# Patient Record
Sex: FEMALE | Race: WHITE | NOT HISPANIC OR LATINO | URBAN - METROPOLITAN AREA
[De-identification: names, ages, dates, MRNs, and addresses within clinical notes are randomized per-mention and may not be internally consistent; named-entity substitution may affect disease eponyms.]

---

## 2019-07-03 ENCOUNTER — HOSPITAL ENCOUNTER (OUTPATIENT)
Dept: RADIOLOGY | Facility: MEDICAL CENTER | Age: 49
End: 2019-07-03
Attending: INTERNAL MEDICINE
Payer: COMMERCIAL

## 2019-07-03 DIAGNOSIS — E27.9 ADRENAL HYPERTENSION (HCC): ICD-10-CM

## 2019-07-03 DIAGNOSIS — I15.2 ADRENAL HYPERTENSION (HCC): ICD-10-CM

## 2019-07-03 DIAGNOSIS — E83.59 TUMORAL CALCINOSIS: ICD-10-CM

## 2019-07-03 PROCEDURE — 4410556 CT-CARDIAC SCORING

## 2022-01-17 ENCOUNTER — OFFICE VISIT (OUTPATIENT)
Dept: MEDICAL GROUP | Facility: CLINIC | Age: 52
End: 2022-01-17
Payer: COMMERCIAL

## 2022-01-17 VITALS
OXYGEN SATURATION: 96 % | BODY MASS INDEX: 23.92 KG/M2 | HEIGHT: 62 IN | TEMPERATURE: 99.4 F | SYSTOLIC BLOOD PRESSURE: 110 MMHG | RESPIRATION RATE: 18 BRPM | HEART RATE: 105 BPM | DIASTOLIC BLOOD PRESSURE: 72 MMHG | WEIGHT: 130 LBS

## 2022-01-17 DIAGNOSIS — E27.40 ADRENAL INSUFFICIENCY (HCC): ICD-10-CM

## 2022-01-17 DIAGNOSIS — M25.50 ARTHRALGIA, UNSPECIFIED JOINT: ICD-10-CM

## 2022-01-17 DIAGNOSIS — E11.9 TYPE 2 DIABETES MELLITUS WITHOUT COMPLICATION, WITHOUT LONG-TERM CURRENT USE OF INSULIN (HCC): ICD-10-CM

## 2022-01-17 LAB
HBA1C MFR BLD: 7.2 % (ref 0–5.6)
INT CON NEG: ABNORMAL
INT CON POS: ABNORMAL

## 2022-01-17 PROCEDURE — 99214 OFFICE O/P EST MOD 30 MIN: CPT | Performed by: FAMILY MEDICINE

## 2022-01-17 PROCEDURE — 83036 HEMOGLOBIN GLYCOSYLATED A1C: CPT | Performed by: FAMILY MEDICINE

## 2022-01-17 RX ORDER — CALCITRIOL 0.25 UG/1
CAPSULE, LIQUID FILLED ORAL
COMMUNITY
Start: 2021-12-30 | End: 2023-02-21 | Stop reason: SDUPTHER

## 2022-01-17 RX ORDER — CHLORAL HYDRATE 500 MG
1 CAPSULE ORAL 2 TIMES DAILY
COMMUNITY

## 2022-01-17 RX ORDER — EMPAGLIFLOZIN 10 MG/1
1 TABLET, FILM COATED ORAL
COMMUNITY
Start: 2022-01-06 | End: 2022-03-28

## 2022-01-17 RX ORDER — SPIRONOLACTONE 25 MG/1
TABLET ORAL
COMMUNITY
Start: 2021-11-03 | End: 2022-03-28

## 2022-01-17 RX ORDER — LEVONORGESTREL AND ETHINYL ESTRADIOL 0.15-0.03
1 KIT ORAL DAILY
COMMUNITY
End: 2022-03-28

## 2022-01-17 NOTE — PROGRESS NOTES
Subjective:     CC: Diabetes and Oyung's disease follow-up    HPI:   Bee presents today with a need for A1c check.  She has not been checking her blood sugars at home.  However has been feeling fairly good except for her rheumatologic problems.    She was referred last visit to rheumatology.  They have done a little work-up including x-rays and blood work all of which is negative.  Her symptoms somewhat got better however in the last couple days have been getting worse where she has migratory joint pain.    Her [place]'s disease has been very stable.  She continues to take dexamethasone 0.75 mg daily.    She recently had labs through the rheumatologist.  1 of which included TSH.  That was normal    Problem   Arthralgia    Saw rheumatologist.  To have follow up tomorrow  W/u so far not consistent RA  XR negative  ESR negative  RF negative     Adrenal Insufficiency (Hcc)    Been stable.  On dexamethasone.  .75mg dexamethasone.  No changes.       Cataract of Both Eyes   Type 2 Diabetes Mellitus Without Complication (Hcc)    Time for A1c.  Not checked at home         Current Outpatient Medications Ordered in Epic   Medication Sig Dispense Refill   • Ascorbic Acid 500 MG Cap CR Take  by mouth.     • calcitRIOL (ROCALTROL) 0.25 MCG Cap TAKE 1 CAPSULE BY MOUTH EVERY OTHER DAY     • dexamethasone (DECADRON) 0.75 MG tablet Take 0.75 mg by mouth every day.     • JARDIANCE 10 MG Tab Take 1 Tablet by mouth at bedtime.     • spironolactone (ALDACTONE) 25 MG Tab      • Omega-3 Fatty Acids (FISH OIL) 1000 MG Cap capsule Take 1 Tablet by mouth 2 times a day.     • levonorgestrel-ethinyl estradiol (NORDETTE) 0.15-30 MG-MCG per tablet Take 1 Tablet by mouth every day.       No current [facility]-ordered facility-administered medications on file.       Health Maintenance:     ROS:  Gen: no fevers/chills, no changes in weight  Eyes: no changes in vision  ENT: no sore throat, no hearing loss, no bloody nose  Pulm: no sob, no  "cough  CV: no chest pain, no palpitations  GI: no nausea/vomiting, no diarrhea  : no dysuria  MSk: no myalgias  Skin: no rash  Neuro: no headaches, no numbness/tingling  Heme/Lymph: no easy bruising      Objective:     Exam:  /72 (BP Location: Left arm, Patient Position: Sitting, BP Cuff Size: Adult)   Pulse (!) 105   Temp 37.4 °C (99.4 °F) (Tympanic)   Resp 18   Ht 1.575 m (5' 2\")   Wt 59 kg (130 lb)   SpO2 96%   BMI 23.78 kg/m²  Body mass index is 23.78 kg/m².    Gen: Alert and oriented, No apparent distress.  Neck: Neck is supple without lymphadenopathy.  Lungs: Normal effort, CTA bilaterally, no wheezes, rhonchi, or rales  CV: Regular rate and rhythm. No murmurs, rubs, or gallops.  Ext: No clubbing, cyanosis, edema.      Labs: TSH normal.  RA labs normal.  Normal xrays    Assessment & Plan:     51 y.o. female with the following -     Problem List Items Addressed This Visit     Adrenal insufficiency (HCC)     Continue dexamethasone         Arthralgia     Follow  Up with rheumatologist  Using tylenol arthritis for pain         Type 2 diabetes mellitus without complication (HCC)     A1c here and was 7.2.  7.6 last time.    No medication changes at this time         Relevant Medications    JARDIANCE 10 MG Tab    Other Relevant Orders    POCT  A1C (Completed)              Return in about 3 months (around 4/17/2022) for diabetes.    Please note that this dictation was created using voice recognition software. I have made every reasonable attempt to correct obvious errors, but I expect that there are errors of grammar and possibly content that I did not discover before finalizing the note.      "

## 2022-03-18 ENCOUNTER — TELEPHONE (OUTPATIENT)
Dept: MEDICAL GROUP | Facility: CLINIC | Age: 52
End: 2022-03-18
Payer: COMMERCIAL

## 2022-03-18 NOTE — TELEPHONE ENCOUNTER
"Patient called with medical concerns below: Numbness and tingly in hands and feet- constant   Balance issues- using \"walking stick\"  She is wondering if she should be seen sooner than her appointment with you on April 19th or if you suggest a referral be placed at this time. Thank you  "

## 2022-03-21 NOTE — TELEPHONE ENCOUNTER
Phone Number Called: [phone]    Call outcome: Did not leave a detailed message. Requested patient to call back.

## 2022-03-23 NOTE — TELEPHONE ENCOUNTER
Phone Number Called: [phone]    Call outcome: Did not leave a detailed message. Requested patient to call back.

## 2022-03-28 ENCOUNTER — OFFICE VISIT (OUTPATIENT)
Dept: MEDICAL GROUP | Facility: CLINIC | Age: 52
End: 2022-03-28
Payer: COMMERCIAL

## 2022-03-28 VITALS
HEART RATE: 95 BPM | BODY MASS INDEX: 24.66 KG/M2 | SYSTOLIC BLOOD PRESSURE: 137 MMHG | WEIGHT: 134 LBS | HEIGHT: 62 IN | OXYGEN SATURATION: 94 % | DIASTOLIC BLOOD PRESSURE: 75 MMHG

## 2022-03-28 DIAGNOSIS — E27.40 ADRENAL INSUFFICIENCY (HCC): ICD-10-CM

## 2022-03-28 DIAGNOSIS — M25.59 PAIN IN OTHER JOINT: ICD-10-CM

## 2022-03-28 PROCEDURE — 99215 OFFICE O/P EST HI 40 MIN: CPT | Performed by: FAMILY MEDICINE

## 2022-03-28 RX ORDER — LEVONORGESTREL AND ETHINYL ESTRADIOL 0.15-0.03
KIT ORAL
COMMUNITY
Start: 2017-01-16

## 2022-03-28 RX ORDER — CALCITRIOL 0.25 UG/1
0.25 CAPSULE, LIQUID FILLED ORAL
COMMUNITY
Start: 2017-01-16 | End: 2022-03-28

## 2022-03-28 RX ORDER — MELOXICAM 15 MG/1
TABLET ORAL
COMMUNITY
Start: 2021-07-28 | End: 2022-03-28

## 2022-03-28 RX ORDER — SPIRONOLACTONE 25 MG/1
25 TABLET ORAL DAILY
COMMUNITY
Start: 2014-07-24 | End: 2023-02-21 | Stop reason: SDUPTHER

## 2022-03-28 RX ORDER — EMPAGLIFLOZIN 10 MG/1
TABLET, FILM COATED ORAL
COMMUNITY
Start: 2021-10-06 | End: 2022-08-16

## 2022-03-28 NOTE — PROGRESS NOTES
"Subjective:     CC: Arthralgia, myalgia. Hx Addisons,     HPI:     Problem   Arthralgia    All joints painful, swollen sensation. Episodic w exertion, sitting too long. Muscle weakness, fatigue  Intense ache in the hips. Some swelling  Saw rheumatologist.  Labs reviewed, Rheum note reviewed. No elevation in inflammatory markers W/u so far not consistent RA  XR negative  ESR negative  RF negative     Adrenal Insufficiency (Hcc)    Recent f/u w Endocrinology. Does not believe new sx's are related to her Addisons. Been stable.  On dexamethasone.  .75mg dexamethasone.  No changes.   Pt brings recent labs.          Current Outpatient Medications Ordered in Epic   Medication Sig Dispense Refill   • Empagliflozin (JARDIANCE) 10 MG Tab      • levonorgestrel-ethinyl estradiol (NORDETTE) 0.15-30 MG-MCG per tablet KURVELO 0.15-30 MG-MCG TABS     • spironolactone (ALDACTONE) 25 MG Tab Take 25 mg by mouth every day.     • Ascorbic Acid 500 MG Cap CR Take  by mouth.     • calcitRIOL (ROCALTROL) 0.25 MCG Cap TAKE 1 CAPSULE BY MOUTH EVERY OTHER DAY     • dexamethasone (DECADRON) 0.75 MG tablet Take 0.75 mg by mouth every day.     • Omega-3 Fatty Acids (FISH OIL) 1000 MG Cap capsule Take 1 Tablet by mouth 2 times a day.       No current [facility]-ordered facility-administered medications on file.       Health Maintenance:     ROS:  Gen: no fevers/chills, no changes in weight  Eyes: no changes in vision  ENT: no sore throat, no hearing loss, no bloody nose  Pulm: no sob, no cough  CV: no chest pain, no palpitations  GI: no nausea/vomiting, no diarrhea  : no dysuria  MSk: no myalgias  Skin: no rash  Neuro: no headaches, no numbness/tingling  Heme/Lymph: no easy bruising      Objective:     Exam:  /75   Pulse 95   Ht 1.575 m (5' 2\")   Wt 60.8 kg (134 lb)   SpO2 94%   BMI 24.51 kg/m²  Body mass index is 24.51 kg/m².    Gen: Alert and oriented, No apparent distress.  Neck: Neck is supple without lymphadenopathy.  Lungs: Normal " effort, CTA bilaterally, no wheezes, rhonchi, or rales  CV: Regular rate and rhythm. No murmurs, rubs, or gallops.  Ext: No clubbing, cyanosis, edema.          Assessment & Plan:     52 y.o. female with the following -     Problem List Items Addressed This Visit     Adrenal insufficiency (HCC)     Labs reviewed. Addisons appears stable.   Continue current dosing of Decadron. My consider a short burst of higher dosing for symptoms relief/diagnostic reasons if other answers not found.  Keep f/u appt w Dr Garvey         Arthralgia     Of interest, patient reports that her mother had symptoms similar to this before she .  A firm diagnosis was never made.  Symptoms remain atypical, but the combination of myalgias, muscle weakness,  poor coordination and fatigue make me wonder about MS.  I will make a referral to neurology for further evaluation..  Patient is to bring copies of labs from endocrine and from rheumatology.  If this is going to take too long we should consider doing an MRI of the brain sooner.  F/u here as directed.          Relevant Orders    Referral to Neurology      My total time spent caring for the patient on the day of the encounter was 42 minutes.   This does not include time spent on separately billable procedures/tests.    No follow-ups on file.

## 2022-03-28 NOTE — ASSESSMENT & PLAN NOTE
Labs reviewed. Addisons appears stable.   Continue current dosing of Decadron. My consider a short burst of higher dosing for symptoms relief/diagnostic reasons if other answers not found.  Keep f/u appt w Dr Garvey

## 2022-03-28 NOTE — ASSESSMENT & PLAN NOTE
Of interest, patient reports that her mother had symptoms similar to this before she .  A firm diagnosis was never made.  Symptoms remain atypical, but the combination of myalgias, muscle weakness,  poor coordination and fatigue make me wonder about MS.  I will make a referral to neurology for further evaluation..  Patient is to bring copies of labs from endocrine and from rheumatology.  If this is going to take too long we should consider doing an MRI of the brain sooner.  F/u here as directed.

## 2022-04-19 ENCOUNTER — OFFICE VISIT (OUTPATIENT)
Dept: MEDICAL GROUP | Facility: CLINIC | Age: 52
End: 2022-04-19
Payer: COMMERCIAL

## 2022-04-19 VITALS
OXYGEN SATURATION: 96 % | WEIGHT: 135 LBS | HEIGHT: 62 IN | DIASTOLIC BLOOD PRESSURE: 60 MMHG | BODY MASS INDEX: 24.84 KG/M2 | SYSTOLIC BLOOD PRESSURE: 114 MMHG | HEART RATE: 98 BPM | RESPIRATION RATE: 12 BRPM

## 2022-04-19 DIAGNOSIS — E11.9 TYPE 2 DIABETES MELLITUS WITHOUT COMPLICATION, WITHOUT LONG-TERM CURRENT USE OF INSULIN (HCC): ICD-10-CM

## 2022-04-19 DIAGNOSIS — G62.9 NEUROPATHY: ICD-10-CM

## 2022-04-19 DIAGNOSIS — E27.40 ADRENAL INSUFFICIENCY (HCC): ICD-10-CM

## 2022-04-19 DIAGNOSIS — M25.50 ARTHRALGIA, UNSPECIFIED JOINT: ICD-10-CM

## 2022-04-19 PROCEDURE — 99214 OFFICE O/P EST MOD 30 MIN: CPT | Performed by: FAMILY MEDICINE

## 2022-04-19 NOTE — ASSESSMENT & PLAN NOTE
Could be diabetic neuropathy, but also a demyelinatingg disease possilbe  MRI  EMG nerve conduction

## 2022-04-26 ENCOUNTER — HOSPITAL ENCOUNTER (OUTPATIENT)
Dept: RADIOLOGY | Facility: MEDICAL CENTER | Age: 52
End: 2022-04-26
Attending: FAMILY MEDICINE
Payer: COMMERCIAL

## 2022-04-26 DIAGNOSIS — G62.9 NEUROPATHY: ICD-10-CM

## 2022-04-26 PROCEDURE — 70551 MRI BRAIN STEM W/O DYE: CPT

## 2022-05-20 ENCOUNTER — NON-PROVIDER VISIT (OUTPATIENT)
Dept: NEUROLOGY | Facility: MEDICAL CENTER | Age: 52
End: 2022-05-20
Attending: SPECIALIST
Payer: COMMERCIAL

## 2022-05-20 DIAGNOSIS — G62.9 NEUROPATHY: ICD-10-CM

## 2022-05-20 PROCEDURE — 95886 MUSC TEST DONE W/N TEST COMP: CPT | Mod: 26 | Performed by: SPECIALIST

## 2022-05-20 PROCEDURE — 95913 NRV CNDJ TEST 13/> STUDIES: CPT | Performed by: SPECIALIST

## 2022-05-20 PROCEDURE — 95913 NRV CNDJ TEST 13/> STUDIES: CPT | Mod: 26 | Performed by: SPECIALIST

## 2022-05-20 PROCEDURE — 95886 MUSC TEST DONE W/N TEST COMP: CPT | Performed by: SPECIALIST

## 2022-05-20 NOTE — PROCEDURES
NERVE CONDUCTION STUDIES AND ELECTROMYOGRAPHY REPORT        05/20/22      Referring provider: Daquan Garvey M.D.      SUMMARY OF PATIENT'S CLINICAL HISTORY,PHYSICAL EXAM, AND RATIONALE FOR TESTING:    Ms. Bee Stewart 52 y.o. presenting with extremity numbness and gait ataxia and a patient with diabetes.    Past Medical History is significant for : No past medical history on file.    The electrodiagnostic studies were performed to evaluate for possible peripheral neuropathy versus radiculopathy.      ELECTRODIAGNOSTIC EXAMINATION:  Nerve conduction studies (NCS) and electromyography (EMG) are utilized to evaluate direct or indirect damage to the peripheral nervous system. NCS are performed to measure the nerve(s) response(s) to electrostimulation across a given nerve segment. EMG evaluates the passive and active electrical activity of the muscle(s) in question.  Muscles are innervated by specific peripheral nerves and roots. Often times, several nerves the muscle to be examined in order to determine the presence or absence of the disease process. Furthermore, nerves and muscles may need to be tested in a [id number] comparison, as well as in additional extremities, as this may be crucial in characterizing the extent of the disease process, which may be diffuse or isolated and of varying degree of severity. The extent of the neurodiagnostic exam is justified as it may help arrive to a proper diagnosis, which ultimately may contribute to better management of the patient. Therefore, the nerves to muscles examined during the study were medically necessary.    Unless otherwise noted, temperature of the extremity(s) study was monitored before and during the examination and remained between 32 and 36 degrees C for the upper extremities, and between 30 and 36 degrees C for the lower extremities.      NERVE CONDUCTION STUDIES:  Sensory nerves:   - Bilateral Median sensory nerves were examined.The responses were  within normal limits.  - Bilateral Ulnar sensory nerves were examined. The responses were within normal limits.  - Bilateral Sural nerves were tested. The responses were within normal limits.    Motor nerves:   - Bilateral Median motor nerves were examined. Recording electrodes placed at the Abductor Pollicis Brevis muscles. The responses were within normal limits.  - Bilateral Ulnar motor nerves were examined. Recording electrodes placed at the Abductor Digiti Minimi muscles. The responses were within normal limits.  - Bilateral Tibial nerves were examined. Recording electrodes placed at the Abductor     Hallucis muscles. The responses were within normal limits.  - Bilateral Deep Peroneal motor nerves were examined. Recording electrodes were placed at the Extensor Digitorum Brevis muscles.The responses were within normal limits.     No temporal dispersion or conduction block observed in any of the motor nerves examined.      ELECTROMYOGRAPHY:  The study was performed the concentric needle electrode. Fibrillation and fasciculation activity is graded by convention from none (0) to continuous (4+).  Needle electrode examination was performed in the following muscles: Bilateral deltoid, biceps, triceps, first dorsal interosseous, abductor pollicis brevis, vastus lateralis, tibialis anterior, gastrocnemius, extensor digitorum brevis, abductor hallucis.  The muscles tested demonstrated normal insertional activity, normal motor unit morphology and recruitment. There were no elements suggestive of active denervation.    Nerve Conduction Studies     Stim Site NR Onset (ms) Norm Onset (ms) O-P Amp (µV) Norm O-P Amp Site1 Site2 Delta-P (ms) Dist (cm) Sunil (m/s) Norm Sunil (m/s)   Left Median Anti Sensory (2nd Digit)   Wrist    2.4 <3.6 23.6 >10 Wrist 2nd Digit 3.2 14.0 *44 >50   Right Median Anti Sensory (2nd Digit)   Wrist    2.2 <3.6 34.0 >10 Wrist 2nd Digit 3.2 14.0 *44 >50   Left Sural Anti Sensory (Lat Mall)   Calf    2.7  <4.6 11.2 >4 Calf Lat Mall 3.3 14.0 42 >40   Right Sural Anti Sensory (Lat Mall)   Calf    2.0 <4.6 8.7 >4 Calf Lat Mall 2.9 14.0 48 >40   Left Ulnar Anti Sensory (5th Digit)   Wrist    2.5 <3.1 50.9 >10 Wrist 5th Digit 3.4 14.0 *41 >50   Right Ulnar Anti Sensory (5th Digit)   Wrist    2.4 <3.1 27.3 >10 Wrist 5th Digit 3.2 14.0 *44 >50        Stim Site NR Onset (ms) Norm Onset (ms) O-P Amp (mV) Norm O-P Amp Site1 Site2 Delta-0 (ms) Dist (cm) Sunil (m/s) Norm Sunil (m/s)   Left Median Motor (Abd Poll Brev)   Wrist    3.3 <4 9.7 >6 Elbow Wrist 4.3 24.0 56 >50   Elbow    7.6  9.5          Right Median Motor (Abd Poll Brev)   Wrist    2.8 <4 11.0 >6 Elbow Wrist 4.5 25.0 56 >50   Elbow    7.3  10.3          Left Peroneal EDB Motor (Ext Dig Brev)   Ankle    3.8 <6 3.3 >2.5 B Fib Ankle 7.7 33.0 43 >40   B Fib    11.5  4.0  Poplt B Fib 1.5 10.0 67    Poplt    13.0  3.5              2.4  0.8          Right Peroneal EDB Motor (Ext Dig Brev)   Ankle    4.5 <6 3.5 >2.5 B Fib Ankle 7.5 32.0 43 >40   B Fib    12.0  3.5  Poplt B Fib 1.4 10.0 71    Poplt    13.4  3.3          Left Tibial Motor (Abd Lang Brev)   Ankle    4.4 <6 5.4 >4 Knee Ankle 6.5 34.0 52 >40   Knee    10.9  5.1          Right Tibial Motor (Abd Lang Brev)   Ankle    3.5 <6 8.9 >4 Knee Ankle 7.7 38.0 49 >40   Knee    11.2  7.0          Left Ulnar Motor (Abd Dig Min)   Wrist    2.8 <3.1 10.4 >7 B Elbow Wrist 3.8 19.0 50 >50   B Elbow    6.6  9.7  A Elbow B Elbow 1.1 10.0 91    A Elbow    7.7  9.5          Right Ulnar Motor (Abd Dig Min)   Wrist    2.6 <3.1 7.4 >7 B Elbow Wrist 3.3 19.0 58 >50   B Elbow    5.9  7.1  A Elbow B Elbow 1.5 10.0 67    A Elbow    7.4  6.8                                              Electromyography     Side Muscle Nerve Root Ins Act Fibs Psw Amp Dur Poly Recrt Int Pat Comment   Left VastusLat Femoral L2-4 Nml Nml Nml Nml Nml 0 Nml Nml    Left AntTibialis Dp Br Fibular L4-5 Nml Nml Nml Nml Nml 0 Nml Nml    Left Gastroc Tibial S1-2 Nml Nml Nml  Nml Nml 0 Nml Nml    Left Ext Dig Brev Dp Br Fibular L5, S1 Nml Nml Nml Nml Nml 0 Nml Nml    Left AbdHallucis MedPlantar S1-2 Nml Nml Nml Nml Nml 0 Nml Nml    Right VastusLat Femoral L2-4 Nml Nml Nml Nml Nml 0 Nml Nml    Right AntTibialis Dp Br Fibular L4-5 Nml Nml Nml Nml Nml 0 Nml Nml    Right Gastroc Tibial S1-2 Nml Nml Nml Nml Nml 0 Nml Nml    Right Ext Dig Brev Dp Br Fibular L5, S1 Nml Nml Nml Nml Nml 0 Nml Nml    Right AbdHallucis MedPlantar S1-2 Nml Nml Nml Nml Nml 0 Nml Nml    Right Deltoid Axillary C5-6 Nml Nml Nml Nml Nml 0 Nml Nml    Right Biceps Musculocut C5-6 Nml Nml Nml Nml Nml 0 Nml Nml    Right Triceps Radial C6-7-8 Nml Nml Nml Nml Nml 0 Nml Nml    Right 1stDorInt Ulnar C8-T1 Nml Nml Nml Nml Nml 0 Nml Nml    Right Abd Poll Brev Median C8-T1 Nml Nml Nml Nml Nml 0 Nml Nml    Left Deltoid Axillary C5-6 Nml Nml Nml Nml Nml 0 Nml Nml    Left Biceps Musculocut C5-6 Nml Nml Nml Nml Nml 0 Nml Nml    Left Triceps Radial C6-7-8 Nml Nml Nml Nml Nml 0 Nml Nml    Left 1stDorInt Ulnar C8-T1 Nml Nml Nml Nml Nml 0 Nml Nml    Left Abd Poll Brev Median C8-T1 Nml Nml Nml Nml Nml 0 Nml Nml          DIAGNOSTIC INTERPRETATION:   Extensive electrodiagnostic studies were performed to the bilateral upper extremities and bilateral lower extremities.  Results are as follows:    1.  Normal EMG and nerve conduction study right upper extremity.    2.  Normal EMG and nerve conduction study right lower extremity.    3.  Normal EMG and nerve conduction study left upper extremity.    4.  Normal EMG and nerve conduction study left lower extremity.    CLINICAL DISCUSSION:   Specifically, bilateral median and ulnar motor and sensory conduction studies, bilateral tibial and peroneal motor and sural sensory conduction studies were within normal limits.  There were no denervation changes noted in selected muscles studied in the bilateral upper and lower extremities and no evidence of radiculopathy in the bilateral upper and lower  extremities.  This study does not provide an explanation for the patient's complaints.      MICHAEL Bustos M.D.

## 2022-08-16 ENCOUNTER — OFFICE VISIT (OUTPATIENT)
Dept: MEDICAL GROUP | Facility: CLINIC | Age: 52
End: 2022-08-16
Payer: COMMERCIAL

## 2022-08-16 VITALS
RESPIRATION RATE: 16 BRPM | HEART RATE: 85 BPM | BODY MASS INDEX: 24.11 KG/M2 | DIASTOLIC BLOOD PRESSURE: 70 MMHG | OXYGEN SATURATION: 95 % | SYSTOLIC BLOOD PRESSURE: 120 MMHG | HEIGHT: 62 IN | WEIGHT: 131 LBS

## 2022-08-16 DIAGNOSIS — G62.9 NEUROPATHY: ICD-10-CM

## 2022-08-16 DIAGNOSIS — E11.9 TYPE 2 DIABETES MELLITUS WITHOUT COMPLICATION (HCC): ICD-10-CM

## 2022-08-16 DIAGNOSIS — Z12.31 ENCOUNTER FOR SCREENING MAMMOGRAM FOR BREAST CANCER: ICD-10-CM

## 2022-08-16 DIAGNOSIS — M25.50 ARTHRALGIA, UNSPECIFIED JOINT: ICD-10-CM

## 2022-08-16 DIAGNOSIS — Z00.00 HEALTHCARE MAINTENANCE: ICD-10-CM

## 2022-08-16 PROCEDURE — 99214 OFFICE O/P EST MOD 30 MIN: CPT | Performed by: FAMILY MEDICINE

## 2022-08-16 NOTE — PROGRESS NOTES
Subjective:     CC: Polyneuropathy, arthralgias    HPI:   Bee presents today with the above complaints.  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  She has been on longstanding steroids.  However lately she has had diffuse symptoms of migratory arthralgias.  She is also had neuropathy that seems to come and go both in upper and lower extremities.  Her work-up to date has been negative.  She is scheduled to see the neurologist later this week.    Her diabetes has been about the same control.  She was just recently started on Jardiance 10 mg daily and has done well with that medication.  It is time for A1c.    Note another number of quality state metrics need to be checked off.  She is due for mammogram, lipid panel, colonoscopy.    Problem   Neuropathy    Numbness with both hands and feet. Some tingling, not burning. Used to come and go, but now there all the time.  Seems to move up at night. Muscle weakness noted.  Falls common.  Walks with trekking poles.  Wanting an MRI.  Can't Neuro until August.    Numbness in hands and feet.  Sometimes entire leg numbness.  Varies daily     Arthralgia    All joints painful, swollen sensation. Episodic w exertion, sitting too long. Muscle weakness, fatigue  Intense ache in the hips. Some swelling  Saw rheumatologist.  Labs reviewed, Rheum note reviewed. No elevation in inflammatory markers W/u so far not consistent RA  XR negative  ESR negative  RF negative  Today, joint pain somewhat better    Seemingly getting worse.  Has all joints painful.  Has Neurology appointment set up.  Interfering with daily activity     Type 2 Diabetes Mellitus Without Complication (Hcc)    Time for A1c.  Needs test strips.  Last A1c 7.2         Current Outpatient Medications Ordered in Epic   Medication Sig Dispense Refill    Empagliflozin 25 MG Tab Take 25 mg by mouth every day. 90 Tablet 3    levonorgestrel-ethinyl estradiol (NORDETTE) 0.15-30 MG-MCG per tablet KURVELO 0.15-30 MG-MCG TABS  "     spironolactone (ALDACTONE) 25 MG Tab Take 25 mg by mouth every day.      Ascorbic Acid 500 MG Cap CR Take  by mouth.      calcitRIOL (ROCALTROL) 0.25 MCG Cap TAKE 1 CAPSULE BY MOUTH EVERY OTHER DAY      dexamethasone (DECADRON) 0.75 MG tablet Take 0.75 mg by mouth every day.      Omega-3 Fatty Acids (FISH OIL) 1000 MG Cap capsule Take 1 Tablet by mouth 2 times a day.       No current [facility]-ordered facility-administered medications on file.       Health Maintenance:     ROS:  Gen: no fevers/chills, no changes in weight  Eyes: no changes in vision  ENT: no sore throat, no hearing loss, no bloody nose  Pulm: no sob, no cough  CV: no chest pain, no palpitations  GI: no nausea/vomiting, no diarrhea  : no dysuria  MSk: no myalgias  Skin: no rash  Neuro: no headaches, no numbness/tingling  Heme/Lymph: no easy bruising      Objective:     Exam:  /70 (BP Location: Right arm, Patient Position: Sitting, BP Cuff Size: Adult)   Pulse 85   Resp 16   Ht 1.575 m (5' 2\")   Wt 59.4 kg (131 lb)   SpO2 95%   BMI 23.96 kg/m²  Body mass index is 23.96 kg/m².    Gen: Alert and oriented, No apparent distress.  Neck: Neck is supple without lymphadenopathy.  Lungs: Normal effort, CTA bilaterally, no wheezes, rhonchi, or rales  CV: Regular rate and rhythm. No murmurs, rubs, or gallops.  Ext: No clubbing, cyanosis, edema.  Full range of motion of LS spine.  She does have some sciatic notch tenderness.  Deep tendon reflexes are equal with a negative straight leg raising.  Range of motion of the C-spine is normal.  Some tenderness in her paraspinous muscles.      Labs: See labs    Assessment & Plan:     52 y.o. female with the following -     Problem List Items Addressed This Visit       Arthralgia     Will hold off on treatment until after neuro appointment         Type 2 diabetes mellitus without complication (HCC)     A1c 7.4  Increase Jardiance to 25mg         Relevant Medications    Empagliflozin 25 MG Tab    Other " Relevant Orders    Comp Metabolic Panel    Neuropathy     Neurology referral  NC normal  Discussed with patient, will hold off on treatments until neuro appointment          Other Visit Diagnoses       Encounter for screening mammogram for breast cancer        Relevant Orders    MA-SCREENING MAMMO BILAT W/CAD    Healthcare maintenance        Relevant Orders    COLOGUARD (FIT DNA)    Lipid Profile                Return in about 6 weeks (around 9/27/2022) for neuropathy, arthritis.    Please note that this dictation was created using voice recognition software. I have made every reasonable attempt to correct obvious errors, but I expect that there are errors of grammar and possibly content that I did not discover before finalizing the note.

## 2022-08-16 NOTE — LETTER
"[employer]" [facility]  Daquan Garvey M.D.  [address]  [place] [address]  Fax: [phone]   Authorization for Release/Disclosure of   Protected Health Information   Name: VALENTINA STEWART : 1970 SSN: [id number]   Address: [address] [address] Phone:    [phone] (home) [phone] (work)   I authorize the entity listed below to release/disclose the PHI below to:   [employer] [employer]/Daquan Garvye M.D. and Daquan Garvey M.D.   Provider or Entity Name:  [facility]   Address   City, State, [facility]   Phone:      Fax:     Reason for request: continuity of care   Information to be released:    [  ] LAST COLONOSCOPY,  including any PATH REPORT and follow-up  [  ] LAST FIT/COLOGUARD RESULT [  ] LAST DEXA  [  ] LAST MAMMOGRAM  [ x ] LAST PAP  [  ] LAST LABS [  ] RETINA EXAM REPORT  [  ] IMMUNIZATION RECORDS  [  ] Release all info      [  ] Check here and initial the line next to each item to release ALL health information INCLUDING  _____ Care and treatment for drug and / or alcohol abuse  _____ HIV testing, infection status, or AIDS  _____ Genetic Testing    DATES OF SERVICE OR TIME PERIOD TO BE DISCLOSED: _____________  I understand and acknowledge that:  * This Authorization may be revoked at any time by you in writing, except if your health information has already been used or disclosed.  * Your health information that will be used or disclosed as a result of you signing this authorization could be re-disclosed by the recipient. If this occurs, your re-disclosed health information may no longer be protected by State or Federal laws.  * You may refuse to sign this Authorization. Your refusal will not affect your ability to obtain treatment.  * This Authorization becomes effective upon signing and will  on (date) __________.      If no date is indicated, this Authorization will  one (1) year from the signature date.    Name: Valentina Stewart    Signature:   Date:      8/16/2022       PLEASE FAX REQUESTED RECORDS BACK TO: [phone]

## 2022-08-16 NOTE — ASSESSMENT & PLAN NOTE
Neurology referral  NC normal  Discussed with patient, will hold off on treatments until neuro appointment

## 2022-08-30 ENCOUNTER — OFFICE VISIT (OUTPATIENT)
Dept: NEUROLOGY | Facility: MEDICAL CENTER | Age: 52
End: 2022-08-30
Attending: PSYCHIATRY & NEUROLOGY
Payer: COMMERCIAL

## 2022-08-30 VITALS
HEART RATE: 86 BPM | BODY MASS INDEX: 24.18 KG/M2 | TEMPERATURE: 98.7 F | WEIGHT: 131.39 LBS | DIASTOLIC BLOOD PRESSURE: 54 MMHG | HEIGHT: 62 IN | SYSTOLIC BLOOD PRESSURE: 116 MMHG | OXYGEN SATURATION: 99 %

## 2022-08-30 DIAGNOSIS — R20.9 SENSORY DISTURBANCE: ICD-10-CM

## 2022-08-30 DIAGNOSIS — R53.1 WEAKNESS: ICD-10-CM

## 2022-08-30 PROCEDURE — 99205 OFFICE O/P NEW HI 60 MIN: CPT | Performed by: PSYCHIATRY & NEUROLOGY

## 2022-08-30 PROCEDURE — 99212 OFFICE O/P EST SF 10 MIN: CPT | Performed by: PSYCHIATRY & NEUROLOGY

## 2022-08-30 ASSESSMENT — PATIENT HEALTH QUESTIONNAIRE - PHQ9: CLINICAL INTERPRETATION OF PHQ2 SCORE: 0

## 2022-08-30 NOTE — PROGRESS NOTES
Chief Complaint   Patient presents with    New Patient     Neuromuscular     Patient is referred by Ronaldo Weaver M.D. for initial consult.    History of present illness:  Bee Stewart 52 y.o. female presents today for sensory disturbance/imbalance. Occupation: manager computers  History is obtained from patient.  and Patient is accompanied by self.    Duration/timing: as below  Context: Sensory disturbance/imbalance: Patient has been suffering with multiple symptoms of unclear etiology.  In June 2021 she woke up with abrupt joint pain/joint burning improved with movement.  Associated with generalized fatigue.  Was seen by rheumatology (Dr. Blanco) with a low concern for rheumatologic disease after work-up.  The joint pains are still present though improved and can be intermittent in nature.  In January 2022 she began having trouble with walking and balance.  Overall sluggish feeling like weights on her legs.  She can stumble at times.  Generalized weakness.  In February she began feeling numbness in the palms of the hands and soles of the feet that is present at all times but can wax and wane going up to her shoulders and low back-worse with being sedentary and can improve within minutes if she changes positions.  Her torso is spared.  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  Associated signs/symptoms: Neck pain; history of headaches with ocular aura - seems more frequent; chronic hearing loss; shakiness in the hands intermittently  Denies: bladder incontinence, bowel incontinence, vision changes/loss or diplopia, other weakness, other numbness/tingling, swallowing difficulties, speech disturbance, incoordination, depression, anxiety, rash, loss of consciousness, diplopia, falls, snoring/apnea during sleep, HIV or syphilis risk factors, and dark urine, fevers, hx of EtOH abuse, other supplements, dietary restrictions, inability to go up and down stairs, diarrhea    Patient has  "tried:  -Steven Chi      Past medical history:   Past Medical History:   Diagnosis Date    [place] disease (HCC)     Diabetes (HCC)     Migraines    Prediabetes since age 30 and diabetes for > 10 years.    Past surgical history:   Past Surgical History:   Procedure Laterality Date    CATARACT PHACO WITH IOL  2/27/2013    Performed by Stas Thomas M.D. at [facility]       Family history:   Family History   Problem Relation Age of Onset    Diabetes Mother     Diabetes Father     Cancer Father         Pancreatic   1 identical twin sister - breast CA.  1 daughter - asthma as kid.    Social history:   Tobacco Use    Smoking status: Never    Smokeless tobacco: Never   Vaping Use    Vaping Use: Never used   Substance and Sexual Activity    Alcohol use: Not Currently    Drug use: Not Currently         Current medications:   Current Outpatient Medications   Medication    Empagliflozin 25 MG Tab    levonorgestrel-ethinyl estradiol (NORDETTE) 0.15-30 MG-MCG per tablet    spironolactone (ALDACTONE) 25 MG Tab    Ascorbic Acid 500 MG Cap CR    calcitRIOL (ROCALTROL) 0.25 MCG Cap    dexamethasone (DECADRON) 0.75 MG tablet    Omega-3 Fatty Acids (FISH OIL) 1000 MG Cap capsule     No current facility-administered medications for this visit.     Medication Allergy:  Allergies   Allergen Reactions    Bee Venom     Hydrocortisone Unspecified    Iodine Unspecified    Latex Rash    Shellfish Allergy Anaphylaxis    Other Drug Anaphylaxis       ROS - per HPI    Physical examination:   Vitals:    08/30/22 0922   BP: 116/54   BP Location: Right arm   Patient Position: Sitting   BP Cuff Size: Adult   Pulse: 86   Temp: 37.1 °C (98.7 °F)   TempSrc: Temporal   SpO2: 99%   Weight: 59.6 kg (131 lb 6.3 oz)   Height: 1.575 m (5' 2\")     General: Patient in well nourished in no apparent distress.  HENT: Normocephalic, atraumatic.  Cardiovascular: No lower extremity edema.  Respiratory: Normal respiratory effort.   Psychiatric: " Pleasant.     NEUROLOGICAL EXAM:   Mental status: Awake, alert and fully oriented to person, place, time, and situation. Normal attention, concentration, and fund of knowledge for education level.   Speech and language: Speech is fluent without errors and clear.  Cranial nerve exam:  II: Pupils are equally round and reactive to light. Visual fields are intact by confrontation.  III, IV, VI: EOMI, no diplopia, no ptosis.  V: Sensation to light touch is normal over V1-3 distributions bilaterally.   and Jaw closure strength is normal.  VII: Facial movements are symmetrical. There is no facial droop. , Eye closure strength is normal., and Cheek puff strength is normal. .  VIII: Hearing intact to soft speech and finger rub bilaterally  IX: Palate elevates symmetrically, uvula is midline. Dysarthria is not present.  XI: Shoulder shrug are symmetrical and strong.   XII: Tongue protrudes midline., Tongue strength is normal., and No tongue fasciculations.    Motor exam:  Fine kinetic tremor on finger-to-nose.  Tone is very mildly increased in the left upper extremity and right lower extremity.  Otherwise normal tone.  No appreciable fatigable weakness in the extremities.  Muscle strength:     Right  Left  Deltoid   5/5  5/5      Biceps   5/5  5/5  Triceps  5/5  5/5   Wrist extensors 5/5  5/5  Wrist flexors  5/5  5/5     5/5  5/5  Interossei  5/5  5/5  Hip flexors  5/5  5/5  Quadriceps  5/5  5/5   Adduction  5/5  5/5  Abduction  5/5  5/5   Hamstrings  5/5  5/5  Dorsiflexors  5/5  5/5  Plantarflexors  5/5  55/5  Toe extension  5/5  5/5  Foot inversion  5/5  5/5  Foot eversion  5/5  5/5  NT = not tested    Sensory exam:  Intact to Light touch, Temperature, Vibration, and Proprioception in bilateral upper and lower extremity.  Duration at least 11 seconds in the extremities.    Reflexes:       Right  Left  Biceps   1/4  1/4  Triceps  0/4  0/4  Brachioradialis trace/4  trace/4  Knee jerk  2/4  2/4  Ankle  jerk  1/4  1/4   bilateral toes are downgoing to plantar stimulation..    Coordination: shows a normal finger-nose-finger and heel to shin bilaterally.   Gait:  Narrow based and stable , negative Romberg, negative Trendelenburg sign      ANCILLARY DATA REVIEWED:   Lab Data Review:  Lab Results   Component Value Date/Time    HBA1C 7.6 (H) 04/19/2022 09:41 AM    HBA1C 7.2 (A) 01/17/2022 09:01 AM      Workup to date:  - (elevated)  -RF neg  -CCP neg  -ESR 6, 5  -CK 42  -CBC, CMP normal except for alk phos 40, MCV 99  -TSH normal    EMG/NCS (Dr. Bustos, 5/2022): normal 4 limb NCS/EMG  I have personally reviewed the patient's EMGNCS.        Imaging:   MRI brain without contrast April 2022:  No acute intracranial process.  Nonspecific T2 hyperintensities are noted in the periventricular and deep white matter, most likely related to chronic microvascular ischemia.    Records reviewed: Chart reviewed.  Patient is having sensory disturbance in the hands and feet and worsening balance.  Patient followed up with Dr. Blanco (rheumatology) in March 2022 for arthralgias.  Stable Trenton's disease.  Controlled type 2 diabetes.        ASSESSMENT AND PLAN:    1. Sensory disturbance: Patient presenting with generalized symptoms of unclear etiology.  Neurological exam is grossly unremarkable except for some very mildly increased tone in the left upper and right lower extremity of unclear etiology.  Basic work-up to date by primary care/rheumatology has been nonrevealing.  Given involvement of all 4 extremities, imaging of the C-spine and to rule out central pathology is reasonable.  MRI brain without contrast in April 2022 with nonspecific T2 hyperintensities.  Extensive electrodiagnostic testing by Dr. Bustos was unremarkable for large fiber process, myopathy, radiculopathy which is consistent with her exam today.  Metabolic causes can be considered.  No appreciable fatigable weakness on exam today.  Exam is not consistent with  Parkinson's disease.  - CREATINE KINASE; Future  - FOLATE; Future  - VITAMIN B6; Future  - VITAMIN B12; Future  - IMMUNOELECTROPHORESIS, SERUM; Future  - MR-CERVICAL SPINE-W/O; Future    2. Weakness: as above      FOLLOW-UP: Return in about 3 months (around 11/30/2022).  EDUCATION AND COUNSELING:  -I personally discussed the following with the patient:   Differential diagnosis and medical reasoning as above, reasons for further testing    The patient/family communicates understanding of the above and agrees that due to the complexity of the diagnosis/management, results of any testing and further recommendations will typically be discussed/made during a face to face encounter in my office. The patient and/or family further understands it is their responsibility to keep proper follow up for safe and effective management of their condition. Failure to do so, may result in discharge from [facility].     This dictation was created using voice recognition software. I have made every reasonable attempt to avoid dictation errors, but this document may contain an error not identified before finalizing. If the error changes the accuracy of the document, I would appreciate it being brought to my attention. Thank you.    Total time: 60 min  Additional non-face to face time was spent: reviewing diagnostic workup to date, reviewing/obtaining separately obtained history, counseling and educating the patient/family/caregiver, ordering medications, tests, or procedures, documenting clinical information in EMR, and independently interpreting results       Micaela Espinosa MD  Neurology

## 2022-09-06 LAB
CK SERPL-CCNC: 36 U/L (ref 32–182)
FOLATE SERPL-MCNC: 17.2 NG/ML
IGA SERPL-MCNC: 153 MG/DL (ref 87–352)
IGG SERPL-MCNC: 605 MG/DL (ref 586–1602)
IGM SERPL-MCNC: 65 MG/DL (ref 26–217)
PROT PATTERN SERPL IFE-IMP: NORMAL
VIT B12 SERPL-MCNC: 228 PG/ML (ref 232–1245)
VIT B6 SERPL-MCNC: 11.7 UG/L (ref 3.4–65.2)

## 2022-09-09 ENCOUNTER — HOSPITAL ENCOUNTER (OUTPATIENT)
Dept: RADIOLOGY | Facility: MEDICAL CENTER | Age: 52
End: 2022-09-09
Attending: PSYCHIATRY & NEUROLOGY
Payer: COMMERCIAL

## 2022-09-09 DIAGNOSIS — R53.1 WEAKNESS: ICD-10-CM

## 2022-09-09 DIAGNOSIS — R20.9 SENSORY DISTURBANCE: ICD-10-CM

## 2022-09-09 PROCEDURE — 72141 MRI NECK SPINE W/O DYE: CPT

## 2022-09-28 ENCOUNTER — NON-PROVIDER VISIT (OUTPATIENT)
Dept: NEUROLOGY | Facility: MEDICAL CENTER | Age: 52
End: 2022-09-28
Payer: COMMERCIAL

## 2022-09-28 VITALS
TEMPERATURE: 98.2 F | HEART RATE: 90 BPM | BODY MASS INDEX: 24.38 KG/M2 | WEIGHT: 132.5 LBS | SYSTOLIC BLOOD PRESSURE: 122 MMHG | OXYGEN SATURATION: 96 % | DIASTOLIC BLOOD PRESSURE: 76 MMHG | HEIGHT: 62 IN

## 2022-09-28 DIAGNOSIS — G62.9 NEUROPATHY: Primary | ICD-10-CM

## 2022-09-28 PROCEDURE — 96372 THER/PROPH/DIAG INJ SC/IM: CPT | Performed by: PSYCHIATRY & NEUROLOGY

## 2022-09-28 PROCEDURE — 96372 THER/PROPH/DIAG INJ SC/IM: CPT

## 2022-09-28 PROCEDURE — 700111 HCHG RX REV CODE 636 W/ 250 OVERRIDE (IP): Performed by: PSYCHIATRY & NEUROLOGY

## 2022-09-28 RX ORDER — CYANOCOBALAMIN 1000 UG/ML
1000 INJECTION, SOLUTION INTRAMUSCULAR; SUBCUTANEOUS ONCE
Status: COMPLETED | OUTPATIENT
Start: 2022-09-28 | End: 2022-09-28

## 2022-09-28 RX ADMIN — CYANOCOBALAMIN 1000 MCG: 1000 INJECTION, SOLUTION INTRAMUSCULAR; SUBCUTANEOUS at 10:40

## 2022-10-05 ENCOUNTER — NON-PROVIDER VISIT (OUTPATIENT)
Dept: NEUROLOGY | Facility: MEDICAL CENTER | Age: 52
End: 2022-10-05
Attending: PSYCHIATRY & NEUROLOGY
Payer: COMMERCIAL

## 2022-10-05 VITALS
BODY MASS INDEX: 24.42 KG/M2 | HEIGHT: 62 IN | HEART RATE: 71 BPM | DIASTOLIC BLOOD PRESSURE: 70 MMHG | TEMPERATURE: 97.9 F | OXYGEN SATURATION: 96 % | RESPIRATION RATE: 18 BRPM | WEIGHT: 132.72 LBS | SYSTOLIC BLOOD PRESSURE: 96 MMHG

## 2022-10-05 DIAGNOSIS — G62.9 NEUROPATHY: Primary | ICD-10-CM

## 2022-10-05 PROCEDURE — 96372 THER/PROPH/DIAG INJ SC/IM: CPT | Performed by: PSYCHIATRY & NEUROLOGY

## 2022-10-05 RX ORDER — CYANOCOBALAMIN 1000 UG/ML
1000 INJECTION, SOLUTION INTRAMUSCULAR; SUBCUTANEOUS
Qty: 1 ML | Refills: 0 | Status: SHIPPED | OUTPATIENT
Start: 2022-10-05 | End: 2022-10-06

## 2022-10-12 ENCOUNTER — NON-PROVIDER VISIT (OUTPATIENT)
Dept: NEUROLOGY | Facility: MEDICAL CENTER | Age: 52
End: 2022-10-12
Payer: COMMERCIAL

## 2022-10-12 DIAGNOSIS — R20.9 SENSORY DISTURBANCE: ICD-10-CM

## 2022-10-12 PROCEDURE — 700111 HCHG RX REV CODE 636 W/ 250 OVERRIDE (IP): Performed by: PSYCHIATRY & NEUROLOGY

## 2022-10-12 PROCEDURE — 96372 THER/PROPH/DIAG INJ SC/IM: CPT | Performed by: PSYCHIATRY & NEUROLOGY

## 2022-10-12 RX ORDER — CYANOCOBALAMIN 1000 UG/ML
1000 INJECTION, SOLUTION INTRAMUSCULAR; SUBCUTANEOUS ONCE
Status: COMPLETED | OUTPATIENT
Start: 2022-10-12 | End: 2022-10-12

## 2022-10-12 RX ADMIN — CYANOCOBALAMIN 1000 MCG: 1000 INJECTION, SOLUTION INTRAMUSCULAR; SUBCUTANEOUS at 10:53

## 2022-10-13 NOTE — NON-PROVIDER
Provided patient a Vitamin B 12 injection in the Right Glute on 10/12/2022 at 10:00 am. Patient tolerated and no reverse reaction.

## 2022-10-19 ENCOUNTER — NON-PROVIDER VISIT (OUTPATIENT)
Dept: NEUROLOGY | Facility: MEDICAL CENTER | Age: 52
End: 2022-10-19
Payer: COMMERCIAL

## 2022-10-19 DIAGNOSIS — E53.8 B12 DEFICIENCY: ICD-10-CM

## 2022-10-19 PROCEDURE — 700111 HCHG RX REV CODE 636 W/ 250 OVERRIDE (IP): Performed by: REGISTERED NURSE

## 2022-10-19 PROCEDURE — 96372 THER/PROPH/DIAG INJ SC/IM: CPT | Performed by: REGISTERED NURSE

## 2022-10-19 RX ORDER — CYANOCOBALAMIN 1000 UG/ML
1000 INJECTION, SOLUTION INTRAMUSCULAR; SUBCUTANEOUS ONCE
Status: COMPLETED | OUTPATIENT
Start: 2022-10-19 | End: 2022-10-19

## 2022-10-19 RX ADMIN — CYANOCOBALAMIN 1000 MCG: 1000 INJECTION, SOLUTION INTRAMUSCULAR; SUBCUTANEOUS at 09:55

## 2022-10-24 ENCOUNTER — OFFICE VISIT (OUTPATIENT)
Dept: MEDICAL GROUP | Facility: CLINIC | Age: 52
End: 2022-10-24
Payer: COMMERCIAL

## 2022-10-24 VITALS
OXYGEN SATURATION: 95 % | HEIGHT: 62 IN | WEIGHT: 133 LBS | DIASTOLIC BLOOD PRESSURE: 76 MMHG | HEART RATE: 76 BPM | RESPIRATION RATE: 16 BRPM | BODY MASS INDEX: 24.48 KG/M2 | SYSTOLIC BLOOD PRESSURE: 115 MMHG

## 2022-10-24 DIAGNOSIS — M25.50 ARTHRALGIA, UNSPECIFIED JOINT: ICD-10-CM

## 2022-10-24 DIAGNOSIS — E11.9 TYPE 2 DIABETES MELLITUS WITHOUT COMPLICATION, WITHOUT LONG-TERM CURRENT USE OF INSULIN (HCC): ICD-10-CM

## 2022-10-24 DIAGNOSIS — G62.9 NEUROPATHY: ICD-10-CM

## 2022-10-24 DIAGNOSIS — D51.0 PERNICIOUS ANEMIA: ICD-10-CM

## 2022-10-24 PROCEDURE — 99214 OFFICE O/P EST MOD 30 MIN: CPT | Performed by: FAMILY MEDICINE

## 2022-10-24 NOTE — PROGRESS NOTES
Subjective:     CC: Neuropathy arthralgia diabetes    HPI:   Bee presents today with a need for a follow-up for the above problems.  After she was seen by her neurologist they decided that this was most likely consistent with pernicious anemia.  She was started on weekly vitamin B12 shots.  She feels much better less fatigue.  Neurologically not much changes however.  She is still ataxic.  In addition her joint pain has not changed at all.  She was evaluated by rheumatology who felt that this was noninflammatory arthralgia.    She is also here for follow-up of her diabetes her last A1c was 7.4.  However its only been 2 months since her last A1c so she is not ready for a new one yet.  She is noticing that her fasting blood sugars are better controlled.    Problem   Pernicious Anemia    Dx as pernicious anemia from neurology.  Feeling much better     Neuropathy    Numbness with both hands and feet. Some tingling, not burning. Used to come and go, but now there all the time.  Seems to move up at night. Muscle weakness noted.  Falls common.  Walks with trekking poles.  Wanting an MRI.  Can't Neuro until August.    Numbness in hands and feet.  Sometimes entire leg numbness.  Varies daily    Saw neurology and feels most likely due to pernicious anemia.     Arthralgia    All joints painful, swollen sensation. Episodic w exertion, sitting too long. Muscle weakness, fatigue  Intense ache in the hips. Some swelling  Saw rheumatologist.  Labs reviewed, Rheum note reviewed. No elevation in inflammatory markers W/u so far not consistent RA  XR negative  ESR negative  RF negative  Today, joint pain somewhat better    Seemingly getting worse.  Has all joints painful.  Has Neurology appointment set up.  Interfering with daily activity    Neurology treating for PA.  However, joints not improving.  Muscles improving.     Type 2 Diabetes Mellitus Without Complication (Hcc)    Time for A1c.  Needs test strips.  Last A1c  "7.2    Not ready for A1c.           Current Outpatient Medications Ordered in Epic   Medication Sig Dispense Refill    Empagliflozin 25 MG Tab Take 25 mg by mouth every day. 90 Tablet 3    levonorgestrel-ethinyl estradiol (NORDETTE) 0.15-30 MG-MCG per tablet KURVELO 0.15-30 MG-MCG TABS      spironolactone (ALDACTONE) 25 MG Tab Take 25 mg by mouth every day.      Ascorbic Acid 500 MG Cap CR Take  by mouth.      calcitRIOL (ROCALTROL) 0.25 MCG Cap TAKE 1 CAPSULE BY MOUTH EVERY OTHER DAY      dexamethasone (DECADRON) 0.75 MG tablet Take 0.75 mg by mouth every day.      Omega-3 Fatty Acids (FISH OIL) 1000 MG Cap capsule Take 1 Tablet by mouth 2 times a day.       No current [facility]-ordered facility-administered medications on file.       Health Maintenance:     ROS:  Gen: no fevers/chills, no changes in weight  Eyes: no changes in vision  ENT: no sore throat, no hearing loss, no bloody nose  Pulm: no sob, no cough  CV: no chest pain, no palpitations  GI: no nausea/vomiting, no diarrhea  : no dysuria  MSk: no myalgias  Skin: no rash  Neuro: no headaches, no numbness/tingling  Heme/Lymph: no easy bruising      Objective:     Exam:  /76 (BP Location: Right arm, Patient Position: Sitting, BP Cuff Size: Adult)   Pulse 76   Resp 16   Ht 1.575 m (5' 2\")   Wt 60.3 kg (133 lb)   SpO2 95%   BMI 24.33 kg/m²  Body mass index is 24.33 kg/m².    Gen: Alert and oriented, No apparent distress.  Neck: Neck is supple without lymphadenopathy.  Lungs: Normal effort, CTA bilaterally, no wheezes, rhonchi, or rales  CV: Regular rate and rhythm. No murmurs, rubs, or gallops.  Ext: No clubbing, cyanosis, edema.      Labs: No new labs    Assessment & Plan:     52 y.o. female with the following -     Problem List Items Addressed This Visit       Arthralgia     Consider inflammatory arthritis since dx of PA.           Relevant Orders    Sed Rate    Type 2 diabetes mellitus without complication (HCC)     A1c order given         Relevant " Orders    HEMOGLOBIN A1C    Neuropathy     Continue per neurology.  Has been getting IM Vit B12.  Switching to oral         Pernicious anemia     Follow up CBC         Relevant Orders    CBC WITH DIFFERENTIAL     Still with ataxia must consider heavy metal poisoning as a possible etiology.      Return in about 3 months (around 1/24/2023) for arthitis and diabetes.    Please note that this dictation was created using voice recognition software. I have made every reasonable attempt to correct obvious errors, but I expect that there are errors of grammar and possibly content that I did not discover before finalizing the note.

## 2023-01-05 ENCOUNTER — TELEPHONE (OUTPATIENT)
Dept: NEUROLOGY | Facility: MEDICAL CENTER | Age: 53
End: 2023-01-05
Payer: COMMERCIAL

## 2023-01-10 ENCOUNTER — OFFICE VISIT (OUTPATIENT)
Dept: NEUROLOGY | Facility: MEDICAL CENTER | Age: 53
End: 2023-01-10
Attending: PSYCHIATRY & NEUROLOGY
Payer: COMMERCIAL

## 2023-01-10 VITALS
HEART RATE: 100 BPM | BODY MASS INDEX: 24.75 KG/M2 | HEIGHT: 62 IN | WEIGHT: 134.48 LBS | TEMPERATURE: 98.1 F | SYSTOLIC BLOOD PRESSURE: 114 MMHG | OXYGEN SATURATION: 94 % | DIASTOLIC BLOOD PRESSURE: 68 MMHG

## 2023-01-10 DIAGNOSIS — E53.8 B12 DEFICIENCY: ICD-10-CM

## 2023-01-10 DIAGNOSIS — R20.9 SENSORY DISTURBANCE: ICD-10-CM

## 2023-01-10 PROCEDURE — 99214 OFFICE O/P EST MOD 30 MIN: CPT | Performed by: PSYCHIATRY & NEUROLOGY

## 2023-01-10 PROCEDURE — 99212 OFFICE O/P EST SF 10 MIN: CPT | Performed by: PSYCHIATRY & NEUROLOGY

## 2023-01-10 RX ORDER — CYANOCOBALAMIN 1000 UG/ML
1000 INJECTION, SOLUTION INTRAMUSCULAR; SUBCUTANEOUS
COMMUNITY

## 2023-01-10 ASSESSMENT — PATIENT HEALTH QUESTIONNAIRE - PHQ9: CLINICAL INTERPRETATION OF PHQ2 SCORE: 0

## 2023-01-10 NOTE — PROGRESS NOTES
Chief Complaint   Patient presents with    Follow-Up     Neuromuscular     History of present illness:  Bee Stewart 52 y.o. female presents today for sensory disturbance/imbalance follow-up.   Occupation: manager computers  History is obtained from patient.  and Patient is accompanied by self.    Duration/timing: as below  Context: Sensory disturbance/imbalance: Patient has been suffering with multiple symptoms of unclear etiology.  In June 2021 she woke up with abrupt joint pain/joint burning improved with movement.  Associated with generalized fatigue.  Was seen by rheumatology (Dr. Blanco) with a low concern for rheumatologic disease after work-up.  The joint pains are still present though improved and can be intermittent in nature.  In January 2022 she began having trouble with walking and balance.  Overall sluggish feeling like weights on her legs.  She can stumble at times.  Generalized weakness.  In February she began feeling numbness in the palms of the hands and soles of the feet that is present at all times but can wax and wane going up to her shoulders and low back-worse with being sedentary and can improve within minutes if she changes positions.  Her torso is spared.  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  Associated signs/symptoms: Neck pain; history of headaches with ocular aura - seems more frequent; chronic hearing loss; shakiness in the hands intermittently  Denies: bladder incontinence, bowel incontinence, vision changes/loss or diplopia, other weakness, other numbness/tingling, swallowing difficulties, speech disturbance, incoordination, depression, anxiety, rash, loss of consciousness, diplopia, falls, snoring/apnea during sleep, HIV or syphilis risk factors, and dark urine, fevers, hx of EtOH abuse, other supplements, dietary restrictions, inability to go up and down stairs, diarrhea    Patient has tried:  -Steven Chi    Subjective: Patient was last seen in neurology  clinic on 8/2022.    Sensory disturbance: improved numbness, focus, cognition, fatigue, and strength. Her pain is moving still and involves the joints. She is on oral supplement B12.  She is not starting any other supplements.  She did complete her IM injections as instructed.    She is still using her walking stick due to balance which is not back to baseline but improving.      Past medical history:   Past Medical History:   Diagnosis Date    [place] disease (HCC)     Diabetes (HCC)     Migraines    Prediabetes since age 30 and diabetes for > 10 years.    Past surgical history:   Past Surgical History:   Procedure Laterality Date    CATARACT PHACO WITH IOL  02/27/2013    Performed by Stas Thomas M.D. at [facility]    PRIMARY C SECTION         Family history:   Family History   Problem Relation Age of Onset    Diabetes Mother     Diabetes Father     Cancer Father         Pancreatic   1 identical twin sister - breast CA.  1 daughter - asthma as kid.    Social history:   Tobacco Use    Smoking status: Never    Smokeless tobacco: Never   Vaping Use    Vaping Use: Never used   Substance and Sexual Activity    Alcohol use: Not Currently    Drug use: Not Currently         Current medications:   Current Outpatient Medications   Medication    cyanocobalamin (VITAMIN B-12) 1000 MCG/ML Solution    Empagliflozin 25 MG Tab    levonorgestrel-ethinyl estradiol (NORDETTE) 0.15-30 MG-MCG per tablet    spironolactone (ALDACTONE) 25 MG Tab    Ascorbic Acid 500 MG Cap CR    calcitRIOL (ROCALTROL) 0.25 MCG Cap    dexamethasone (DECADRON) 0.75 MG tablet    Omega-3 Fatty Acids (FISH OIL) 1000 MG Cap capsule     No current facility-administered medications for this visit.     Medication Allergy:  Allergies   Allergen Reactions    Bee Venom     Hydrocortisone Unspecified    Iodine Unspecified    Latex Rash    Other Drug Anaphylaxis    Shellfish Allergy Anaphylaxis       ROS - per HPI    Physical examination:   Vitals:  "   01/10/23 1456   BP: 114/68   BP Location: Right arm   Patient Position: Sitting   BP Cuff Size: Adult   Pulse: 100   Temp: 36.7 °C (98.1 °F)   TempSrc: Temporal   SpO2: 94%   Weight: 61 kg (134 lb 7.7 oz)   Height: 1.575 m (5' 2\")     General: Patient in well nourished in no apparent distress.  HENT: Normocephalic, atraumatic.  Cardiovascular: No lower extremity edema.  Respiratory: Normal respiratory effort.   Psychiatric: Pleasant.     NEUROLOGICAL EXAM:   Mental status: Awake, alert and fully oriented to person, place, time, and situation. Normal attention, concentration, and fund of knowledge for education level.   Speech and language: Speech is fluent without errors and clear.  Cranial nerve exam: Prior  II: Pupils are equally round and reactive to light. Visual fields are intact by confrontation.  III, IV, VI: EOMI, no diplopia, no ptosis.  V: Sensation to light touch is normal over V1-3 distributions bilaterally.   and Jaw closure strength is normal.  VII: Facial movements are symmetrical. There is no facial droop. , Eye closure strength is normal., and Cheek puff strength is normal. .  VIII: Hearing intact to soft speech and finger rub bilaterally  IX: Palate elevates symmetrically, uvula is midline. Dysarthria is not present.  XI: Shoulder shrug are symmetrical and strong.   XII: Tongue protrudes midline., Tongue strength is normal., and No tongue fasciculations.    Motor exam:  Today: tone is normal in the BUE/LE    Prior: Tone is very mildly increased in the left upper extremity and right lower extremity.  Otherwise normal tone.  No appreciable fatigable weakness in the extremities. Fine kinetic tremor on finger-to-nose.      Muscle strength: Prior     Right  Left  Deltoid   5/5  5/5      Biceps   5/5  5/5  Triceps  5/5  5/5   Wrist extensors 5/5  5/5  Wrist flexors  5/5  5/5     5/5  5/5  Interossei  5/5  5/5  Hip " flexors  5/5  5/5  Quadriceps  5/5  5/5   Adduction  5/5  5/5  Abduction  5/5  5/5   Hamstrings  5/5  5/5  Dorsiflexors  5/5  5/5  Plantarflexors  5/5  55/5  Toe extension  5/5  5/5  Foot inversion  5/5  5/5  Foot eversion  5/5  5/5  NT = not tested    Sensory exam: Stable sensory exam  Intact to Light touch, Temperature, Vibration, and Proprioception in bilateral upper and lower extremity.  Duration at least 11 seconds in the extremities.     Reflexes: Stable     Right  Left  Biceps   1/4  1/4  Triceps  0/4  0/4  Brachioradialis  trace/4  trace/4  Knee jerk  2/4  2/4  Ankle jerk  0/4  1/4   bilateral toes are downgoing to plantar stimulation.    Coordination: shows a normal finger-nose-finger and heel to shin bilaterally. Prior  Gait:  Narrow based and stable      ANCILLARY DATA REVIEWED:   Lab Data Review:  Lab Results   Component Value Date/Time    HBA1C 8.1 (H) 11/18/2022 05:18 AM    HBA1C 7.2 (A) 01/17/2022 09:01 AM      Workup to date:  - (elevated)  -RF neg  -CCP neg  -ESR 6, 5  -CK 42, repeat 36  -CBC, CMP normal except for alk phos 40, MCV 99  -TSH normal  -Sed rate 2  -B6 normal  -Folate normal  -Serum immunofixation without evidence of monoclonal protein  -B12 228 (low)    EMG/NCS (Dr. Bustos, 5/2022): normal 4 limb NCS/EMG  I have personally reviewed the patient's EMGNCS.      Imaging:   MRI brain without contrast April 2022:  No acute intracranial process.  Nonspecific T2 hyperintensities are noted in the periventricular and deep white matter, most likely related to chronic microvascular ischemia.    MRI C-spine without contrast September 2022:  Degenerative changes at the C3-4, C4-5, C5-6 and C6-7 levels as described above.  There is moderate canal narrowing at C4-5 and C5-6 with slight cord deformity.  There is moderate right foraminal narrowing at and C3-4, C4-5, C5-C6.  At C6-7, there is severe right foraminal narrowing.    Records reviewed: None        ASSESSMENT AND PLAN:    1.  B12  deficiency: Patient originally presenting with generalized symptoms with unremarkable electrodiagnostic testing for neuropathy and MRI brain without contrast.  With further work-up, she was found to have B12 deficiency and has since been replaced parenterally with transition to oral supplementation for about 6 weeks now.  She has had significant improvement of her symptoms since replacement though not back to baseline.  Will order follow-up labs and intrinsic factor antibody as she has no other reasons to be B12 deficient.  MRI C-spine was ordered in September 2022 as documented above.  -Repeat B12 with methylmalonic acid  -Intrinsic factor antibody ordered  -Patient to follow-up with primary care in February with lab results and further management due to my departure      FOLLOW-UP: Return for with PCP.  Return to neurology clinic as needed.  EDUCATION AND COUNSELING:  -I personally discussed the following with the patient:   Medical reasoning as above and treatment expectations    The patient/family communicates understanding of the above and agrees that due to the complexity of the diagnosis/management, results of any testing and further recommendations will typically be discussed/made during a face to face encounter in my office. The patient and/or family further understands it is their responsibility to keep proper follow up for safe and effective management of their condition. Failure to do so, may result in discharge from [facility].     This dictation was created using voice recognition software. I have made every reasonable attempt to avoid dictation errors, but this document may contain an error not identified before finalizing. If the error changes the accuracy of the document, I would appreciate it being brought to my attention. Thank you.    Micaela Espinosa MD  Neurology

## 2023-01-20 LAB
IF BLOCK AB SER-ACNC: 0.9 AU/ML (ref 0–1.1)
METHYLMALONATE SERPL-SCNC: 136 NMOL/L (ref 0–378)
REQUEST PROBLEM   100552: NORMAL
VIT B12 SERPL-MCNC: NORMAL PG/ML

## 2023-01-27 LAB — VIT B12 SERPL-MCNC: 541 PG/ML (ref 232–1245)

## 2023-02-21 ENCOUNTER — OFFICE VISIT (OUTPATIENT)
Dept: MEDICAL GROUP | Facility: CLINIC | Age: 53
End: 2023-02-21
Payer: COMMERCIAL

## 2023-02-21 VITALS
RESPIRATION RATE: 16 BRPM | HEART RATE: 90 BPM | OXYGEN SATURATION: 95 % | DIASTOLIC BLOOD PRESSURE: 81 MMHG | BODY MASS INDEX: 25.21 KG/M2 | HEIGHT: 62 IN | WEIGHT: 137 LBS | SYSTOLIC BLOOD PRESSURE: 128 MMHG

## 2023-02-21 DIAGNOSIS — M25.50 ARTHRALGIA, UNSPECIFIED JOINT: ICD-10-CM

## 2023-02-21 DIAGNOSIS — R25.1 TREMOR: ICD-10-CM

## 2023-02-21 DIAGNOSIS — G62.9 NEUROPATHY: ICD-10-CM

## 2023-02-21 DIAGNOSIS — E11.9 TYPE 2 DIABETES MELLITUS WITHOUT COMPLICATION, WITHOUT LONG-TERM CURRENT USE OF INSULIN (HCC): ICD-10-CM

## 2023-02-21 DIAGNOSIS — R10.13 DYSPEPSIA: ICD-10-CM

## 2023-02-21 DIAGNOSIS — D51.0 PERNICIOUS ANEMIA: ICD-10-CM

## 2023-02-21 PROCEDURE — 99214 OFFICE O/P EST MOD 30 MIN: CPT | Performed by: FAMILY MEDICINE

## 2023-02-21 RX ORDER — SPIRONOLACTONE 25 MG/1
25 TABLET ORAL DAILY
Qty: 90 TABLET | Refills: 3 | Status: SHIPPED | OUTPATIENT
Start: 2023-02-21

## 2023-02-21 RX ORDER — CALCITRIOL 0.25 UG/1
0.25 CAPSULE, LIQUID FILLED ORAL DAILY
Qty: 90 CAPSULE | Refills: 3 | Status: SHIPPED | OUTPATIENT
Start: 2023-02-21 | End: 2024-02-22

## 2023-02-21 NOTE — PROGRESS NOTES
Subjective:     CC: Diabetes, [place]'s disease, neuropathy    HPI:   Bee presents today with follow-up on the above problems.  Her neuropathy was found to be due to pernicious anemia.  Neurologist no longer feels that they need to see her and wants us to manage her for pernicious anemia.  Just had a B12 level done which was normal.  She is feeling fine.    Her Marl[place]'s disease has been in control she takes her daily dose of Decadron.  He has had no flares.    She has noticed her fasting blood sugars have been better since she increased the empafliflozin.  However her postprandial blood sugars have been increased.    Problem   Tremor    Not resting tremor.  More of intention tremor.  Not related to day.  Slight worse when stressed.  Only in arms.  Right worse.     Dyspepsia    Seems to be sensitive to certain foods     Pernicious Anemia    Dx as pernicious anemia from neurology.  Feeling much better     Neuropathy    Numbness with both hands and feet. Some tingling, not burning. Used to come and go, but now there all the time.  Seems to move up at night. Muscle weakness noted.  Falls common.  Walks with trekking poles.  Wanting an MRI.  Can't Neuro until August.    Numbness in hands and feet.  Sometimes entire leg numbness.  Varies daily    Saw neurology and feels most likely due to pernicious anemia.    Since on B12, numbness has gone     Arthralgia    All joints painful, swollen sensation. Episodic w exertion, sitting too long. Muscle weakness, fatigue  Intense ache in the hips. Some swelling  Saw rheumatologist.  Labs reviewed, Rheum note reviewed. No elevation in inflammatory markers W/u so far not consistent RA  XR negative  ESR negative  RF negative  Today, joint pain somewhat better    Seemingly getting worse.  Has all joints painful.  Has Neurology appointment set up.  Interfering with daily activity    Neurology treating for PA.  However, joints not improving.  Muscles improving.    Still with  "migratory arthritis.     Type 2 Diabetes Mellitus Without Complication (Hcc)    Time for A1c.  Needs test strips.  Last A1c 7.2    Time for A1c          Current Outpatient Medications Ordered in Epic   Medication Sig Dispense Refill    dexamethasone (DECADRON) 0.75 MG tablet Take 1 Tablet by mouth every day. 90 Tablet 3    calcitRIOL (ROCALTROL) 0.25 MCG Cap Take 1 Capsule by mouth every day. 90 Capsule 3    spironolactone (ALDACTONE) 25 MG Tab Take 1 Tablet by mouth every day. 90 Tablet 3    cyanocobalamin (VITAMIN B-12) 1000 MCG/ML Solution Inject 1,000 mcg into the shoulder, thigh, or buttocks every 30 days.      Empagliflozin 25 MG Tab Take 25 mg by mouth every day. 90 Tablet 3    levonorgestrel-ethinyl estradiol (NORDETTE) 0.15-30 MG-MCG per tablet KURVELO 0.15-30 MG-MCG TABS      Ascorbic Acid 500 MG Cap CR Take  by mouth.      Omega-3 Fatty Acids (FISH OIL) 1000 MG Cap capsule Take 1 Tablet by mouth 2 times a day.       No current [facility]-ordered facility-administered medications on file.       Health Maintenance:     ROS:  Gen: no fevers/chills, no changes in weight  Eyes: no changes in vision  ENT: no sore throat, no hearing loss, no bloody nose  Pulm: no sob, no cough  CV: no chest pain, no palpitations  GI: no nausea/vomiting, no diarrhea  : no dysuria  MSk: no myalgias  Skin: no rash  Neuro: no headaches, no numbness/tingling  Heme/Lymph: no easy bruising      Objective:     Exam:  /81 (BP Location: Left arm, Patient Position: Sitting, BP Cuff Size: Adult)   Pulse 90   Resp 16   Ht 1.575 m (5' 2\")   Wt 62.1 kg (137 lb)   SpO2 95%   BMI 25.06 kg/m²  Body mass index is 25.06 kg/m².    Gen: Alert and oriented, No apparent distress.  Neck: Neck is supple without lymphadenopathy.  Lungs: Normal effort, CTA bilaterally, no wheezes, rhonchi, or rales  CV: Regular rate and rhythm. No murmurs, rubs, or gallops.  Ext: No clubbing, cyanosis, edema.      Labs: A1c is 8.1.    Assessment & Plan:     53 y.o. " female with the following -     Problem List Items Addressed This Visit       Arthralgia    Type 2 diabetes mellitus without complication (HCC)     A1c 8.1  Will recheck in 3 months.  If A1c stays up will add meds.         Relevant Orders    POCT A1C    Neuropathy     Feeling much better on B12         Pernicious anemia     Continue B12         Tremor     Idiopathic intention tremor.   Consider beta blocker         Dyspepsia     Continue to watch what you eat          Long discussion regarding what to do with her diabetes she does not want to increase medication at this time.  We will give her 3 months if the A1c stays elevated we will increase her medication at that point.      Return in about 3 months (around 5/21/2023) for DM, neuropathy.    Please note that this dictation was created using voice recognition software. I have made every reasonable attempt to correct obvious errors, but I expect that there are errors of grammar and possibly content that I did not discover before finalizing the note.

## 2023-05-31 ENCOUNTER — OFFICE VISIT (OUTPATIENT)
Dept: MEDICAL GROUP | Facility: CLINIC | Age: 53
End: 2023-05-31
Payer: COMMERCIAL

## 2023-05-31 DIAGNOSIS — D51.0 PERNICIOUS ANEMIA: ICD-10-CM

## 2023-05-31 DIAGNOSIS — E11.9 TYPE 2 DIABETES MELLITUS WITHOUT COMPLICATION, WITHOUT LONG-TERM CURRENT USE OF INSULIN (HCC): ICD-10-CM

## 2023-05-31 DIAGNOSIS — M35.01 SJOGREN'S SYNDROME WITH KERATOCONJUNCTIVITIS SICCA (HCC): ICD-10-CM

## 2023-05-31 LAB
HBA1C MFR BLD: 7.8 % (ref ?–5.8)
POCT INT CON NEG: NEGATIVE
POCT INT CON POS: POSITIVE

## 2023-05-31 PROCEDURE — 99214 OFFICE O/P EST MOD 30 MIN: CPT | Performed by: FAMILY MEDICINE

## 2023-05-31 PROCEDURE — 83036 HEMOGLOBIN GLYCOSYLATED A1C: CPT | Performed by: FAMILY MEDICINE

## 2023-05-31 NOTE — PROGRESS NOTES
Subjective:     CC: Diabetes, [place]'s disease, pernicious anemia    HPI:   Bee presents today with a checkup for the above problems.  This time for her A1c.  States to eating normally.  He exercises daily and has been able to do better since her pernicious anemia has been under better control.    She has not had any Young issues continues to take her Decadron 0.75 mg daily    She just started the Jardiance medication for her diabetes.  Time to repeat her A1c.  Tolerating the medicine well.    He continues with dry eyes dry mouth.  Rheumatology does not feel that she has any inflammatory type of arthritis.  Ever, she just continues to have wrist pain and shoulder pain.  However, she can control things with Tylenol.    Problem   Sjogren's Syndrome With Keratoconjunctivitis Sicca (Hcc)    Dry eyes and mouth without underlying inflammatory arthritis.         Pernicious Anemia    Dx as pernicious anemia from neurology.  Feeling much better    Doing better and can now walk without a cane.  Energy back to normal       Type 2 Diabetes Mellitus Without Complication (Hcc)    Time for A1c.  Needs test strips.  Last A1c 7.2    Time for A1c            Current Outpatient Medications Ordered in Epic   Medication Sig Dispense Refill    dexamethasone (DECADRON) 0.75 MG tablet Take 1 Tablet by mouth every day. 90 Tablet 3    calcitRIOL (ROCALTROL) 0.25 MCG Cap Take 1 Capsule by mouth every day. 90 Capsule 3    spironolactone (ALDACTONE) 25 MG Tab Take 1 Tablet by mouth every day. 90 Tablet 3    cyanocobalamin (VITAMIN B-12) 1000 MCG/ML Solution Inject 1,000 mcg into the shoulder, thigh, or buttocks every 30 days.      Empagliflozin 25 MG Tab Take 25 mg by mouth every day. 90 Tablet 3    levonorgestrel-ethinyl estradiol (NORDETTE) 0.15-30 MG-MCG per tablet KURVELO 0.15-30 MG-MCG TABS      Ascorbic Acid 500 MG Cap CR Take  by mouth.      Omega-3 Fatty Acids (FISH OIL) 1000 MG Cap capsule Take 1 Tablet by mouth 2 times a day.   "     No current [facility]-ordered facility-administered medications on file.       Health Maintenance:     ROS:  Gen: no fevers/chills, no changes in weight  Eyes: no changes in vision  ENT: no sore throat, no hearing loss, no bloody nose  Pulm: no sob, no cough  CV: no chest pain, no palpitations  GI: no nausea/vomiting, no diarrhea  : no dysuria  MSk: no myalgias  Skin: no rash  Neuro: no headaches, no numbness/tingling  Heme/Lymph: no easy bruising      Objective:     Exam:  BP (P) 111/74 (BP Location: Right arm, Patient Position: Sitting, BP Cuff Size: Adult)   Pulse (P) 73   Resp (P) 18   Ht (P) 1.575 m (5' 2\")   Wt (P) 62.6 kg (138 lb)   SpO2 (P) 94%   BMI (P) 25.24 kg/m²  Body mass index is 25.24 kg/m² (pended).    Gen: Alert and oriented, No apparent distress.  Neck: Neck is supple without lymphadenopathy.  Lungs: Normal effort, CTA bilaterally, no wheezes, rhonchi, or rales  CV: Regular rate and rhythm. No murmurs, rubs, or gallops.  Ext: No clubbing, cyanosis, edema.      Labs: A1c 7.8    Assessment & Plan:     53 y.o. female with the following -     Problem List Items Addressed This Visit       Type 2 diabetes mellitus without complication (HCC)     Was 8.1 last visit, now 7.8  Consider adding GLP1 medication  Recheck in 3 months.           Relevant Orders    POCT A1C (Completed)    CBC WITH DIFFERENTIAL    Lipid Profile    Pernicious anemia    Relevant Orders    CBC WITH DIFFERENTIAL    METHYLMALONIC ACID, SERUM    VITAMIN B12    Sjogren's syndrome with keratoconjunctivitis sicca (HCC)     Continue saline eye drops and hydration.                  Return in about 3 months (around 8/31/2023) for FATOUMATA PA.    Please note that this dictation was created using voice recognition software. I have made every reasonable attempt to correct obvious errors, but I expect that there are errors of grammar and possibly content that I did not discover before finalizing the note.        "

## 2023-08-30 ENCOUNTER — APPOINTMENT (OUTPATIENT)
Dept: MEDICAL GROUP | Facility: CLINIC | Age: 53
End: 2023-08-30
Payer: COMMERCIAL

## 2023-09-12 ENCOUNTER — OFFICE VISIT (OUTPATIENT)
Dept: MEDICAL GROUP | Facility: CLINIC | Age: 53
End: 2023-09-12
Payer: COMMERCIAL

## 2023-09-12 VITALS
RESPIRATION RATE: 17 BRPM | BODY MASS INDEX: 25.4 KG/M2 | WEIGHT: 138 LBS | HEART RATE: 78 BPM | SYSTOLIC BLOOD PRESSURE: 113 MMHG | HEIGHT: 62 IN | OXYGEN SATURATION: 94 % | DIASTOLIC BLOOD PRESSURE: 73 MMHG

## 2023-09-12 DIAGNOSIS — D51.0 PERNICIOUS ANEMIA: ICD-10-CM

## 2023-09-12 DIAGNOSIS — M25.50 ARTHRALGIA, UNSPECIFIED JOINT: ICD-10-CM

## 2023-09-12 DIAGNOSIS — E11.9 TYPE 2 DIABETES MELLITUS WITHOUT COMPLICATION, WITHOUT LONG-TERM CURRENT USE OF INSULIN (HCC): ICD-10-CM

## 2023-09-12 LAB
HBA1C MFR BLD: 8.4 % (ref ?–5.8)
POCT INT CON NEG: NEGATIVE
POCT INT CON POS: POSITIVE

## 2023-09-12 PROCEDURE — 83036 HEMOGLOBIN GLYCOSYLATED A1C: CPT | Performed by: FAMILY MEDICINE

## 2023-09-12 PROCEDURE — 99214 OFFICE O/P EST MOD 30 MIN: CPT | Performed by: FAMILY MEDICINE

## 2023-09-12 PROCEDURE — 3074F SYST BP LT 130 MM HG: CPT | Performed by: FAMILY MEDICINE

## 2023-09-12 PROCEDURE — 3078F DIAST BP <80 MM HG: CPT | Performed by: FAMILY MEDICINE

## 2023-09-12 NOTE — PROGRESS NOTES
"Subjective:     CC: Diabetes, pernicious anemia, arthralgias.    HPI:   Bee presents today with the above problems.  Her blood sugars at home and been increasing.  We discussed the fact that her blood sugars have been increasing last visit and thought if they continue to do so we would have to add medication.    Her pernicious anemia has been stable.  We did increase her milligram amount of vitamin B12 last visit.  She wants another vitamin B12 12 level drawn to see if that is improved.    Her current issues really more around arthralgias she has low back pain all the time.  However she has migratory pain that she states her joints are \"on fire \".  She had a pretty complete rheumatologic work-up approximately a year ago.  All her labs in the past have been negative.    Problem   Pernicious Anemia    Dx as pernicious anemia from neurology.  Feeling much better    Doing better and can now walk without a cane.  Energy back to normal    Increased vit B12 to 1500 mg     Arthralgia    All joints painful, swollen sensation. Episodic w exertion, sitting too long. Muscle weakness, fatigue  Intense ache in the hips. Some swelling  Saw rheumatologist.  Labs reviewed, Rheum note reviewed. No elevation in inflammatory markers W/u so far not consistent RA  XR negative  ESR negative  RF negative  Today, joint pain somewhat better    TOday the paiin seems to be worse.  Mainly in back but migrates with other joints.    Seemingly getting worse.  Has all joints painful.  Has Neurology appointment set up.  Interfering with daily activity    Neurology treating for PA.  However, joints not improving.  Muscles improving.    Still with migratory arthritis.     Type 2 Diabetes Mellitus Without Complication (Hcc)    Time for A1c.  Needs test strips.  Last A1c 7.2    Time for A1c          Current Outpatient Medications Ordered in Epic   Medication Sig Dispense Refill    Semaglutide 3 MG Tab Take 3 mg by mouth every day. 30 Tablet 1    " "Empagliflozin 25 MG Tab Take 25 mg by mouth every day. 90 Tablet 3    dexamethasone (DECADRON) 0.75 MG tablet Take 1 Tablet by mouth every day. 90 Tablet 3    calcitRIOL (ROCALTROL) 0.25 MCG Cap Take 1 Capsule by mouth every day. 90 Capsule 3    spironolactone (ALDACTONE) 25 MG Tab Take 1 Tablet by mouth every day. 90 Tablet 3    cyanocobalamin (VITAMIN B-12) 1000 MCG/ML Solution Inject 1,000 mcg into the shoulder, thigh, or buttocks every 30 days.      levonorgestrel-ethinyl estradiol (NORDETTE) 0.15-30 MG-MCG per tablet KURVELO 0.15-30 MG-MCG TABS      Ascorbic Acid 500 MG Cap CR Take  by mouth.      Omega-3 Fatty Acids (FISH OIL) 1000 MG Cap capsule Take 1 Tablet by mouth 2 times a day.       No current [facility]-ordered facility-administered medications on file.       Health Maintenance:     ROS:  Gen: no fevers/chills, no changes in weight  Eyes: no changes in vision  ENT: no sore throat, no hearing loss, no bloody nose  Pulm: no sob, no cough  CV: no chest pain, no palpitations  GI: no nausea/vomiting, no diarrhea  : no dysuria  MSk: no myalgias  Skin: no rash  Neuro: no headaches, no numbness/tingling  Heme/Lymph: no easy bruising      Objective:     Exam:  /73 (BP Location: Right arm, Patient Position: Sitting, BP Cuff Size: Adult)   Pulse 78   Resp 17   Ht 1.575 m (5' 2\")   Wt 62.6 kg (138 lb)   SpO2 94%   BMI 25.24 kg/m²  Body mass index is 25.24 kg/m².    Gen: Alert and oriented, No apparent distress.  Neck: Neck is supple without lymphadenopathy.  Lungs: Normal effort, CTA bilaterally, no wheezes, rhonchi, or rales  CV: Regular rate and rhythm. No murmurs, rubs, or gallops.  Ext: No clubbing, cyanosis, edema.      Labs: A1c is 8.4    Assessment & Plan:     53 y.o. female with the following -     Problem List Items Addressed This Visit       Arthralgia     Ibuprofen when joints feel \"on fire\"         Relevant Orders    Sed Rate    Type 2 diabetes mellitus without complication (HCC)     A1c 8.4   "       Relevant Medications    Semaglutide 3 MG Tab    Other Relevant Orders    POCT A1C (Completed)    Pernicious anemia     Continue Vit B12         Relevant Orders    VITAMIN B12           No follow-ups on file.    Please note that this dictation was created using voice recognition software. I have made every reasonable attempt to correct obvious errors, but I expect that there are errors of grammar and possibly content that I did not discover before finalizing the note.

## 2023-12-01 ENCOUNTER — OFFICE VISIT (OUTPATIENT)
Dept: MEDICAL GROUP | Facility: CLINIC | Age: 53
End: 2023-12-01
Payer: COMMERCIAL

## 2023-12-01 VITALS
WEIGHT: 133 LBS | RESPIRATION RATE: 16 BRPM | HEART RATE: 79 BPM | HEIGHT: 62 IN | DIASTOLIC BLOOD PRESSURE: 68 MMHG | SYSTOLIC BLOOD PRESSURE: 106 MMHG | OXYGEN SATURATION: 95 % | BODY MASS INDEX: 24.48 KG/M2

## 2023-12-01 DIAGNOSIS — M25.50 ARTHRALGIA, UNSPECIFIED JOINT: ICD-10-CM

## 2023-12-01 DIAGNOSIS — E27.40 ADRENAL INSUFFICIENCY (HCC): ICD-10-CM

## 2023-12-01 DIAGNOSIS — M51.9 LUMBOSACRAL DISC DISEASE: ICD-10-CM

## 2023-12-01 DIAGNOSIS — D51.0 PERNICIOUS ANEMIA: ICD-10-CM

## 2023-12-01 DIAGNOSIS — E11.9 TYPE 2 DIABETES MELLITUS WITHOUT COMPLICATION, WITHOUT LONG-TERM CURRENT USE OF INSULIN (HCC): ICD-10-CM

## 2023-12-01 LAB
HBA1C MFR BLD: 7.5 % (ref ?–5.8)
POCT INT CON NEG: NEGATIVE
POCT INT CON POS: POSITIVE

## 2023-12-01 PROCEDURE — 99214 OFFICE O/P EST MOD 30 MIN: CPT | Performed by: FAMILY MEDICINE

## 2023-12-01 PROCEDURE — 83036 HEMOGLOBIN GLYCOSYLATED A1C: CPT | Performed by: FAMILY MEDICINE

## 2023-12-01 PROCEDURE — 3078F DIAST BP <80 MM HG: CPT | Performed by: FAMILY MEDICINE

## 2023-12-01 PROCEDURE — 3074F SYST BP LT 130 MM HG: CPT | Performed by: FAMILY MEDICINE

## 2023-12-01 ASSESSMENT — FIBROSIS 4 INDEX: FIB4 SCORE: 0.51

## 2023-12-01 NOTE — ASSESSMENT & PLAN NOTE
Saw rheumatology and found disc issues in lumbosacral spine.  Back pain.  Occasional radiation.    Continue physical therapy.  Will see spine specialist.

## 2023-12-01 NOTE — ASSESSMENT & PLAN NOTE
CBC and B12 levels normal   Infliximab Counseling:  I discussed with the patient the risks of infliximab including but not limited to myelosuppression, immunosuppression, autoimmune hepatitis, demyelinating diseases, lymphoma, and serious infections.  The patient understands that monitoring is required including a PPD at baseline and must alert us or the primary physician if symptoms of infection or other concerning signs are noted.

## 2023-12-01 NOTE — ASSESSMENT & PLAN NOTE
Rheumatology referral feels this is all osteoarthritis and not lupus or other inflammatory arthropathy.

## 2023-12-01 NOTE — PROGRESS NOTES
Subjective:     CC: Diabetes, arthralgias, back pain, [place]'s disease    HPI:   Bee presents today with the above problems.  Her [place]'s disease has been well-controlled taking the dexamethasone 0.75 mg daily.  She has not seen an endocrinologist but she does not feel that she needs to things have been stable.    She had just started the empagliflozin and she is here for recheck of her A1c.  She has not noticed any abnormal issues.    She did have generalized chemistry panels done.  And would like to discuss results.    Her numbness and tingling that was associated with pernicious anemia has resolved since her blood count and B12 levels have come to normal.    She just had her rheumatologic evaluation.  The rheumatologist does not feel that she has any inflammatory arthritic condition.  They did notice a change on her pelvic MRI that looked like there was a uterine issue.  An ultrasound has been ordered.    Her back pain evaluated by the rheumatologist felt that this is due to osteoarthritis and she has multilevel disc disease.  She only occasionally gets sciatic type pain.  Most usually gets a dull ache in her lumbar spine area and activities resolved that.    Problem   Lumbosacral Disc Disease   Pernicious Anemia    Dx as pernicious anemia from neurology.  Feeling much better    Doing better and can now walk without a cane.  Energy back to normal    Increased vit B12 to 1500 mg     Arthralgia    All joints painful, swollen sensation. Episodic w exertion, sitting too long. Muscle weakness, fatigue  Intense ache in the hips. Some swelling  Saw rheumatologist.  Labs reviewed, Rheum note reviewed. No elevation in inflammatory markers W/u so far not consistent RA  XR negative  ESR negative  RF negative  Today, joint pain somewhat better    TOday the paiin seems to be worse.  Mainly in back but migrates with other joints.    Seemingly getting worse.  Has all joints painful.  Has Neurology appointment set up.   "Interfering with daily activity    Neurology treating for PA.  However, joints not improving.  Muscles improving.    Still with migratory arthritis.     Adrenal Insufficiency (Hcc)    Recent f/u w Endocrinology. Does not believe new sx's are related to her Addisons. Been stable.  On dexamethasone.  .75mg dexamethasone.  No changes.   Pt brings recent labs.      Type 2 Diabetes Mellitus Without Complication (Hcc)    Time for A1c.  Needs test strips.  Last A1c 7.2    Time for A1c          Current Outpatient Medications Ordered in Epic   Medication Sig Dispense Refill    Semaglutide 7 MG Tab Take 7 mg by mouth every day. 90 Tablet 3    Empagliflozin 25 MG Tab Take 25 mg by mouth every day. 90 Tablet 3    dexamethasone (DECADRON) 0.75 MG tablet Take 1 Tablet by mouth every day. 90 Tablet 3    calcitRIOL (ROCALTROL) 0.25 MCG Cap Take 1 Capsule by mouth every day. 90 Capsule 3    spironolactone (ALDACTONE) 25 MG Tab Take 1 Tablet by mouth every day. 90 Tablet 3    cyanocobalamin (VITAMIN B-12) 1000 MCG/ML Solution Inject 1,000 mcg into the shoulder, thigh, or buttocks every 30 days.      levonorgestrel-ethinyl estradiol (NORDETTE) 0.15-30 MG-MCG per tablet KURVELO 0.15-30 MG-MCG TABS      Ascorbic Acid 500 MG Cap CR Take  by mouth.      Omega-3 Fatty Acids (FISH OIL) 1000 MG Cap capsule Take 1 Tablet by mouth 2 times a day.       No current [facility]-ordered facility-administered medications on file.       Health Maintenance:     ROS:  Gen: no fevers/chills, no changes in weight  Eyes: no changes in vision  ENT: no sore throat, no hearing loss, no bloody nose  Pulm: no sob, no cough  CV: no chest pain, no palpitations  GI: no nausea/vomiting, no diarrhea  : no dysuria  MSk: no myalgias  Skin: no rash  Neuro: no headaches, no numbness/tingling  Heme/Lymph: no easy bruising      Objective:     Exam:  /68 (BP Location: Right arm, Patient Position: Sitting, BP Cuff Size: Adult)   Pulse 79   Resp 16   Ht 1.575 m (5' 2\")  "  Wt 60.3 kg (133 lb)   SpO2 95%   BMI 24.33 kg/m²  Body mass index is 24.33 kg/m².    Gen: Alert and oriented, No apparent distress.  Neck: Neck is supple without lymphadenopathy.  Lungs: Normal effort, CTA bilaterally, no wheezes, rhonchi, or rales  CV: Regular rate and rhythm. No murmurs, rubs, or gallops.  Ext: No clubbing, cyanosis, edema.      Labs: A1c 7.5    Assessment & Plan:     53 y.o. female with the following -     Problem List Items Addressed This Visit       Adrenal insufficiency (HCC)     Stable will continue dexamethasone.         Arthralgia     Rheumatology referral feels this is all osteoarthritis and not lupus or other inflammatory arthropathy.           Type 2 diabetes mellitus without complication (HCC)     A1c 7.5  was 8.4  Continue the same meds  Recheck in 3m         Relevant Orders    POCT A1C    Pernicious anemia     CBC and B12 levels normal         Lumbosacral disc disease     Saw rheumatology and found disc issues in lumbosacral spine.  Back pain.  Occasional radiation.    Continue physical therapy.  Will see spine specialist.                Return in about 3 months (around 3/1/2024) for DM.    Please note that this dictation was created using voice recognition software. I have made every reasonable attempt to correct obvious errors, but I expect that there are errors of grammar and possibly content that I did not discover before finalizing the note.

## 2024-02-22 RX ORDER — CALCITRIOL 0.25 UG/1
0.25 CAPSULE, LIQUID FILLED ORAL DAILY
Qty: 90 CAPSULE | Refills: 0 | Status: SHIPPED | OUTPATIENT
Start: 2024-02-22

## 2024-02-22 NOTE — TELEPHONE ENCOUNTER
Received request via: Pharmacy    Was the patient seen in the last year in this department? Yes    Does the patient have an active prescription (recently filled or refills available) for medication(s) requested? No    Pharmacy Name: [facility]     Does the patient have retirement Plus and need 100 day supply (blood pressure, diabetes and cholesterol meds only)? Patient does not have SCP

## 2024-02-28 NOTE — TELEPHONE ENCOUNTER
Received request via: Pharmacy    Was the patient seen in the last year in this department? Yes    Does the patient have an active prescription (recently filled or refills available) for medication(s) requested? No    Pharmacy Name: [facility]    Does the patient have intermediate Plus and need 100 day supply (blood pressure, diabetes and cholesterol meds only)? Patient does not have SCP

## 2024-03-11 ENCOUNTER — OFFICE VISIT (OUTPATIENT)
Dept: MEDICAL GROUP | Facility: CLINIC | Age: 54
End: 2024-03-11
Payer: COMMERCIAL

## 2024-03-11 VITALS
TEMPERATURE: 99 F | BODY MASS INDEX: 24.11 KG/M2 | SYSTOLIC BLOOD PRESSURE: 95 MMHG | DIASTOLIC BLOOD PRESSURE: 61 MMHG | HEART RATE: 93 BPM | HEIGHT: 62 IN | WEIGHT: 131 LBS | OXYGEN SATURATION: 92 %

## 2024-03-11 DIAGNOSIS — E11.9 TYPE 2 DIABETES MELLITUS WITHOUT COMPLICATION, WITHOUT LONG-TERM CURRENT USE OF INSULIN (HCC): ICD-10-CM

## 2024-03-11 DIAGNOSIS — M35.01 SJOGREN'S SYNDROME WITH KERATOCONJUNCTIVITIS SICCA (HCC): ICD-10-CM

## 2024-03-11 DIAGNOSIS — M51.9 LUMBOSACRAL DISC DISEASE: ICD-10-CM

## 2024-03-11 LAB
HBA1C MFR BLD: 7.4 % (ref ?–5.8)
POCT INT CON NEG: NEGATIVE
POCT INT CON POS: POSITIVE

## 2024-03-11 PROCEDURE — 3074F SYST BP LT 130 MM HG: CPT | Performed by: FAMILY MEDICINE

## 2024-03-11 PROCEDURE — 83036 HEMOGLOBIN GLYCOSYLATED A1C: CPT | Performed by: FAMILY MEDICINE

## 2024-03-11 PROCEDURE — 3078F DIAST BP <80 MM HG: CPT | Performed by: FAMILY MEDICINE

## 2024-03-11 PROCEDURE — 99214 OFFICE O/P EST MOD 30 MIN: CPT | Performed by: FAMILY MEDICINE

## 2024-03-11 ASSESSMENT — FIBROSIS 4 INDEX: FIB4 SCORE: 0.52

## 2024-03-11 ASSESSMENT — PATIENT HEALTH QUESTIONNAIRE - PHQ9: CLINICAL INTERPRETATION OF PHQ2 SCORE: 0

## 2024-03-11 NOTE — PROGRESS NOTES
Subjective:     CC: Diabetes, Sizerock's, low back pain, pernicious anemia  HPI:   Bee presents today with the above problems.  She did go to her rheumatologist who did repeat labs which found no markers positive.  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  She is here primarily today for repeat A1c.  She has been doing well with her diet and exercise.  She is not noticing any other symptoms.    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  However was noted on exam that her systolic blood pressure is 95 which can be need to increase her steroid medications but she does not want to do that at this time.    The physical therapy is working well for her low back pain.  She still gets left-sided radiculopathy.  However feels pretty much normal now.    Her last CBC was normal and her B12 levels were normal.    Problem   Lumbosacral Disc Disease    PT working well.  Mostly pain free.  Still has a few more sessions     Sjogren's Syndrome With Keratoconjunctivitis Sicca (Hcc)    Dry eyes and mouth without underlying inflammatory arthritis.       Type 2 Diabetes Mellitus Without Complication (Hcc)    Time for A1c.  Needs test strips.  Last A1c 7.2    Time for A1c          Current Outpatient Medications Ordered in Epic   Medication Sig Dispense Refill    dexamethasone (DECADRON) 0.75 MG tablet Take 1 Tablet by mouth every day. 90 Tablet 3    calcitRIOL (ROCALTROL) 0.25 MCG Cap Take 1 capsule by mouth once daily 90 Capsule 0    Semaglutide 7 MG Tab Take 7 mg by mouth every day. 90 Tablet 3    Empagliflozin 25 MG Tab Take 25 mg by mouth every day. 90 Tablet 3    spironolactone (ALDACTONE) 25 MG Tab Take 1 Tablet by mouth every day. 90 Tablet 3    cyanocobalamin (VITAMIN B-12) 1000 MCG/ML Solution Inject 1,000 mcg into the shoulder, thigh, or buttocks every 30 days.      Ascorbic Acid 500 MG Cap CR Take  by mouth.      Omega-3 Fatty Acids (FISH OIL) 1000 MG Cap capsule Take 1  "Tablet by mouth 2 times a day.      levonorgestrel-ethinyl estradiol (NORDETTE) 0.15-30 MG-MCG per tablet KURVELO 0.15-30 MG-MCG TABS (Patient not taking: Reported on 3/11/2024)       No current Epic-ordered facility-administered medications on file.       Health Maintenance:     ROS:  Gen: no fevers/chills, no changes in weight  Eyes: no changes in vision  ENT: no sore throat, no hearing loss, no bloody nose  Pulm: no sob, no cough  CV: no chest pain, no palpitations  GI: no nausea/vomiting, no diarrhea  : no dysuria  MSk: no myalgias  Skin: no rash  Neuro: no headaches, no numbness/tingling  Heme/Lymph: no easy bruising      Objective:     Exam:  BP 95/61 (BP Location: Left arm, Patient Position: Sitting)   Pulse 93   Temp 37.2 °C (99 °F) (Temporal)   Ht 1.575 m (5' 2\")   Wt 59.4 kg (131 lb)   SpO2 92%   BMI 23.96 kg/m²  Body mass index is 23.96 kg/m².    Gen: Alert and oriented, No apparent distress.  Neck: Neck is supple without lymphadenopathy.  Lungs: Normal effort, CTA bilaterally, no wheezes, rhonchi, or rales  CV: Regular rate and rhythm. No murmurs, rubs, or gallops.  Ext: No clubbing, cyanosis, edema.      Labs: A1c is 7.4    Assessment & Plan:     54 y.o. female with the following -     Problem List Items Addressed This Visit       Type 2 diabetes mellitus without complication (HCC)     A1c 7.4  Continue rybelsus and jardiance.         Relevant Orders    POCT A1C (Completed)    Sjogren's syndrome with keratoconjunctivitis sicca (HCC)     Improved.  Lab w/u negative.  Still using moisturizing agents.          Lumbosacral disc disease     Continue PT as ordered.                Return in about 3 months (around 6/11/2024) for DM.    Please note that this dictation was created using voice recognition software. I have made every reasonable attempt to correct obvious errors, but I expect that there are errors of grammar and possibly content that I did not discover before finalizing the note.        "

## 2024-05-02 RX ORDER — SPIRONOLACTONE 25 MG/1
25 TABLET ORAL DAILY
Qty: 90 TABLET | Refills: 3 | Status: SHIPPED | OUTPATIENT
Start: 2024-05-02

## 2024-05-02 NOTE — TELEPHONE ENCOUNTER
Received request via: Pharmacy    Was the patient seen in the last year in this department? Yes    Does the patient have an active prescription (recently filled or refills available) for medication(s) requested? No    Pharmacy Name: [facility]    Does the patient have nursing home Plus and need 100 day supply (blood pressure, diabetes and cholesterol meds only)? Patient does not have SCP

## 2024-07-11 ENCOUNTER — PATIENT MESSAGE (OUTPATIENT)
Dept: MEDICAL GROUP | Facility: CLINIC | Age: 54
End: 2024-07-11
Payer: COMMERCIAL

## 2024-07-17 ENCOUNTER — OFFICE VISIT (OUTPATIENT)
Dept: MEDICAL GROUP | Facility: CLINIC | Age: 54
End: 2024-07-17
Payer: COMMERCIAL

## 2024-07-17 VITALS
DIASTOLIC BLOOD PRESSURE: 69 MMHG | RESPIRATION RATE: 16 BRPM | WEIGHT: 128 LBS | HEIGHT: 62 IN | SYSTOLIC BLOOD PRESSURE: 100 MMHG | OXYGEN SATURATION: 94 % | HEART RATE: 76 BPM | BODY MASS INDEX: 23.55 KG/M2

## 2024-07-17 DIAGNOSIS — E27.40 ADRENAL INSUFFICIENCY (HCC): ICD-10-CM

## 2024-07-17 DIAGNOSIS — M25.50 ARTHRALGIA, UNSPECIFIED JOINT: ICD-10-CM

## 2024-07-17 DIAGNOSIS — M51.9 LUMBOSACRAL DISC DISEASE: ICD-10-CM

## 2024-07-17 DIAGNOSIS — E11.9 TYPE 2 DIABETES MELLITUS WITHOUT COMPLICATION, WITHOUT LONG-TERM CURRENT USE OF INSULIN (HCC): ICD-10-CM

## 2024-07-17 PROCEDURE — 3074F SYST BP LT 130 MM HG: CPT | Performed by: FAMILY MEDICINE

## 2024-07-17 PROCEDURE — 99214 OFFICE O/P EST MOD 30 MIN: CPT | Performed by: FAMILY MEDICINE

## 2024-07-17 PROCEDURE — 3078F DIAST BP <80 MM HG: CPT | Performed by: FAMILY MEDICINE

## 2024-07-17 ASSESSMENT — FIBROSIS 4 INDEX: FIB4 SCORE: 0.52

## 2024-08-15 LAB
HBA1C MFR BLD: 7.3 % (ref ?–5.8)
POCT INT CON NEG: NEGATIVE
POCT INT CON POS: POSITIVE

## 2024-08-19 NOTE — TELEPHONE ENCOUNTER
Received request via: Pharmacy    Was the patient seen in the last year in this department? Yes    Does the patient have an active prescription (recently filled or refills available) for medication(s) requested? No    Pharmacy Name: [facility]    Does the patient have [facility] Plus and need 100-day supply? (This applies to ALL medications) Patient does not have SCP

## 2024-08-20 RX ORDER — CALCITRIOL 0.25 UG/1
0.25 CAPSULE, LIQUID FILLED ORAL DAILY
Qty: 90 CAPSULE | Refills: 0 | Status: SHIPPED | OUTPATIENT
Start: 2024-08-20

## 2024-10-16 ENCOUNTER — OFFICE VISIT (OUTPATIENT)
Dept: MEDICAL GROUP | Facility: CLINIC | Age: 54
End: 2024-10-16
Payer: COMMERCIAL

## 2024-10-16 VITALS
WEIGHT: 126 LBS | SYSTOLIC BLOOD PRESSURE: 105 MMHG | HEART RATE: 90 BPM | RESPIRATION RATE: 16 BRPM | DIASTOLIC BLOOD PRESSURE: 67 MMHG | BODY MASS INDEX: 23.05 KG/M2 | OXYGEN SATURATION: 93 %

## 2024-10-16 DIAGNOSIS — E11.9 TYPE 2 DIABETES MELLITUS WITHOUT COMPLICATION, WITHOUT LONG-TERM CURRENT USE OF INSULIN (HCC): ICD-10-CM

## 2024-10-16 DIAGNOSIS — E27.40 ADRENAL INSUFFICIENCY (HCC): ICD-10-CM

## 2024-10-16 DIAGNOSIS — R60.1 GENERALIZED EDEMA: ICD-10-CM

## 2024-10-16 DIAGNOSIS — M25.50 ARTHRALGIA, UNSPECIFIED JOINT: ICD-10-CM

## 2024-10-16 PROCEDURE — 3078F DIAST BP <80 MM HG: CPT | Performed by: FAMILY MEDICINE

## 2024-10-16 PROCEDURE — 99214 OFFICE O/P EST MOD 30 MIN: CPT | Performed by: FAMILY MEDICINE

## 2024-10-16 PROCEDURE — 3074F SYST BP LT 130 MM HG: CPT | Performed by: FAMILY MEDICINE

## 2024-10-16 ASSESSMENT — FIBROSIS 4 INDEX: FIB4 SCORE: 0.71

## 2025-04-16 NOTE — PROGRESS NOTES
"Subjective:     CC: Balance issues and diabetes    HPI:   Bee presents today with a recheck on her balance issues.  She is also here for recheck on her diabetes and and she is due for her A1c.    Bee says that her joint pain seems to be somewhat improved however her balance issues continue.  Her balance seems to be related to a peripheral numbness and tingling.  Although she states that she is not truly numb it is feeling like things are \"falling asleep\".  She is now walking with a walking stick due to problems falling and balance issues.    Bee is also here for recheck of her A1c and diabetic check.  There is a problem with our point-of-care machine today so we are unable to do it in office.    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  She has not had any crises since staying on the dexamethasone 0.75 mg daily.    Problem   Neuropathy    Numbness with both hands and feet. Some tingling, not burning. Used to come and go, but now there all the time.  Seems to move up at night. Muscle weakness noted.  Falls common.  Walks with trekking poles.  Wanting an MRI.  Can't Neuro until August.     Arthralgia    All joints painful, swollen sensation. Episodic w exertion, sitting too long. Muscle weakness, fatigue  Intense ache in the hips. Some swelling  Saw rheumatologist.  Labs reviewed, Rheum note reviewed. No elevation in inflammatory markers W/u so far not consistent RA  XR negative  ESR negative  RF negative  Today, joint pain somewhat better     Adrenal Insufficiency (Hcc)    Recent f/u w Endocrinology. Does not believe new sx's are related to her Addisons. Been stable.  On dexamethasone.  .75mg dexamethasone.  No changes.   Pt brings recent labs.      Type 2 Diabetes Mellitus Without Complication (Hcc)    Time for A1c.  Needs test strips.  Last A1c 7.2         Current Outpatient Medications Ordered in Epic   Medication Sig Dispense Refill   • Empagliflozin (JARDIANCE) 10 MG Tab      • " Comprehensive Nutrition Assessment    Type and Reason for Visit:  Positive nutrition screen (Wt loss, poor appetite)    Nutrition Recommendations/Plan:   Will add Apple Ensure Clear to trays     Malnutrition Assessment:  Malnutrition Status:  At risk for malnutrition (04/16/25 1508)    Context:  Chronic Illness     Findings of the 6 clinical characteristics of malnutrition:  Energy Intake:  Mild decrease in energy intake  Weight Loss:   (10% wt loss over 1 year)     Body Fat Loss:  Unable to assess     Muscle Mass Loss:  Unable to assess    Fluid Accumulation:  Mild Extremities   Strength:  Not Performed    Nutrition Assessment:    Pt was admitted due to melena/UGI Bleed/Pain in knee. Pt is presently npo. Cardiology needs to clear pt for pending EGD. Review of wt history shows 15# wt loss over the past year.    Nutrition Related Findings:    mild BLE edema, Labs: Glu 157, Meds: Reviewed, PMH: COPD, CVA, CHF, DM Wound Type: None       Current Nutrition Intake & Therapies:    Average Meal Intake: Unable to assess     ADULT DIET; Clear Liquid  Diet NPO    Anthropometric Measures:  Height: 162.6 cm (5' 4\")  Ideal Body Weight (IBW): 120 lbs (55 kg)    Admission Body Weight: 59 kg (130 lb 1.1 oz)  Current Body Weight: 59 kg (130 lb 1.1 oz), 108.4 % IBW. Weight Source: Bed scale (4/9)  Current BMI (kg/m2): 22.3  Usual Body Weight: 65.8 kg (145 lb) (4/24)     % Weight Change (Calculated): -10.3                    BMI Categories: Normal Weight (BMI 22.0 to 24.9) age over 65    Estimated Daily Nutrient Needs:  Energy Requirements Based On: Formula  Weight Used for Energy Requirements: Admission  Energy (kcal/day): [place] x 1.2-1.3= 1300- 1400 kcal  Weight Used for Protein Requirements: Admission  Protein (g/day): 1.2g/kg= 70 g  Method Used for Fluid Requirements: ml/Kg  Fluid (ml/day): less than 1500 ml    Nutrition Diagnosis:   Predicted inadequate energy intake related to decreased appetite as evidenced by poor intake  "levonorgestrel-ethinyl estradiol (NORDETTE) 0.15-30 MG-MCG per tablet KURVELO 0.15-30 MG-MCG TABS     • spironolactone (ALDACTONE) 25 MG Tab Take 25 mg by mouth every day.     • Ascorbic Acid 500 MG Cap CR Take  by mouth.     • calcitRIOL (ROCALTROL) 0.25 MCG Cap TAKE 1 CAPSULE BY MOUTH EVERY OTHER DAY     • dexamethasone (DECADRON) 0.75 MG tablet Take 0.75 mg by mouth every day.     • Omega-3 Fatty Acids (FISH OIL) 1000 MG Cap capsule Take 1 Tablet by mouth 2 times a day.       No current [facility]-ordered facility-administered medications on file.       Health Maintenance:     ROS:  Gen: no fevers/chills, no changes in weight  Eyes: no changes in vision  ENT: no sore throat, no hearing loss, no bloody nose  Pulm: no sob, no cough  CV: no chest pain, no palpitations  GI: no nausea/vomiting, no diarrhea  : no dysuria  MSk: no myalgias  Skin: no rash  Neuro: no headaches, no numbness/tingling  Heme/Lymph: no easy bruising      Objective:     Exam:  /60 (BP Location: Right arm, Patient Position: Sitting, BP Cuff Size: Adult)   Pulse 98   Resp 12   Ht 1.575 m (5' 2\")   Wt 61.2 kg (135 lb)   SpO2 96%   BMI 24.69 kg/m²  Body mass index is 24.69 kg/m².    Gen: Alert and oriented, No apparent distress.  Neck: Neck is supple without lymphadenopathy.  Lungs: Normal effort, CTA bilaterally, no wheezes, rhonchi, or rales  CV: Regular rate and rhythm. No murmurs, rubs, or gallops.  Ext: No clubbing, cyanosis, edema.      Labs: None    Assessment & Plan:     52 y.o. female with the following -     Problem List Items Addressed This Visit     Adrenal insufficiency (HCC)     No change         Arthralgia     Continue prn NSAIDS         Type 2 diabetes mellitus without complication (HCC)     A1c at lab         Relevant Orders    HEMOGLOBIN A1C    Neuropathy     Could be diabetic neuropathy, but also a demyelinatingg disease possilbe  MRI  EMG nerve conduction         Relevant Orders    MR-BRAIN-W/O    Referral to " Neurodiagnostics (EEG,EP,EMG/NCS/DBS)              Return in about 3 months (around 7/19/2022) for balance, DM.    Please note that this dictation was created using voice recognition software. I have made every reasonable attempt to correct obvious errors, but I expect that there are errors of grammar and possibly content that I did not discover before finalizing the note.